# Patient Record
Sex: MALE | Race: WHITE | Employment: FULL TIME | URBAN - METROPOLITAN AREA
[De-identification: names, ages, dates, MRNs, and addresses within clinical notes are randomized per-mention and may not be internally consistent; named-entity substitution may affect disease eponyms.]

---

## 2020-01-21 ENCOUNTER — TRANSFERRED RECORDS (OUTPATIENT)
Dept: HEALTH INFORMATION MANAGEMENT | Facility: CLINIC | Age: 25
End: 2020-01-21

## 2020-04-16 ENCOUNTER — TRANSFERRED RECORDS (OUTPATIENT)
Dept: HEALTH INFORMATION MANAGEMENT | Facility: CLINIC | Age: 25
End: 2020-04-16

## 2020-06-04 ENCOUNTER — TRANSFERRED RECORDS (OUTPATIENT)
Dept: HEALTH INFORMATION MANAGEMENT | Facility: CLINIC | Age: 25
End: 2020-06-04

## 2020-06-05 NOTE — TELEPHONE ENCOUNTER
DIAGNOSIS: Send link via txt: [phone]. Referred by Demetrius Rodriguez from [facility] in [place]. 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 Xray competed 06/04/2020, MRI completed in 04/2020 at [facility]. Pt will call to have images pushed. Appt per pt.   APPOINTMENT DATE:    NOTES STATUS DETAILS   OFFICE NOTE from referring provider In process [facility]   OFFICE NOTE from other specialist     DISCHARGE SUMMARY from hospital     DISCHARGE REPORT from the ER     OPERATIVE REPORT     MEDICATION LIST     MRI N/A    CT SCAN recieved    XRAYS (IMAGES & REPORTS) Received      Imaging in [facility]/PACS    6/5/20  Called [facility] for records LVM

## 2020-06-08 ENCOUNTER — PRE VISIT (OUTPATIENT)
Dept: ORTHOPEDICS | Facility: CLINIC | Age: 25
End: 2020-06-08

## 2020-06-08 ENCOUNTER — VIRTUAL VISIT (OUTPATIENT)
Dept: ORTHOPEDICS | Facility: CLINIC | Age: 25
End: 2020-06-08
Payer: COMMERCIAL

## 2020-06-08 VITALS — HEIGHT: 70 IN | WEIGHT: 150 LBS | BODY MASS INDEX: 21.47 KG/M2

## 2020-06-08 DIAGNOSIS — M89.9 BONE LESION: Primary | ICD-10-CM

## 2020-06-08 RX ORDER — TADALAFIL 5 MG/1
5 TABLET ORAL EVERY EVENING
COMMUNITY
End: 2020-08-25

## 2020-06-08 ASSESSMENT — MIFFLIN-ST. JEOR: SCORE: 1666.65

## 2020-06-08 NOTE — LETTER
Date:June 24, 2020      Patient was self referred, no letter generated. Do not send.        [facility] Health Information

## 2020-06-08 NOTE — PROGRESS NOTES
[facility], Orthopaedic Oncology Surgery Consultation  by Koby Mendoza M.D.    Luis Muñoz MRN# [id number]   Age: 24 year old YOB: 1995     Requesting physician: Referred Self  No Ref-Primary, Physician  Dr. Chava Mejia, [facility]  Dr. Keyur Rodriguez, [place], [facility]            Assessment and Plan:   Assessment:  Lesion of L superior pubic ramus.  Diff Dx: Fibrous dysplasia (although radiographic appearance somewhat atypical), osteomyelitis, low grade central BARI, osteoblastoma.     Doubt urinary sx's are related to the pubic bone lesion.    Plan:  1. Whole body bone scan.  2. MRI of Pelvis  3. ESR, CRP, CBC  4. 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     Was seeing urologist due to dysuria.  Having penile pain, some discharge, possible prostatitis.  No evidence of any infection per patient.  Had XR and MRI done with bone lesion identified in pubic ramus.      Current symptoms:  Problem: some groin pain, R sided  Onset and duration:  Began 11/2019  Awakens from sleep due to sx's:no  Precipitating Injury:  no  Other joints or sites painful:  No  Fever: No  Appetite change or weight loss: No             Physical Exam:       EXAMINATION pertinent findings:   VITAL SIGNS: There were no vitals taken for this visit.  There is no height or weight on file to calculate BMI.   PSYCH: Pleasant, healthy-appearing, alert, oriented x3, cooperative. Normal mood and affect.  RESP: non labored breathing   ABD: deferred due to video visit  SKIN: grossly normal   LYMPHATIC: grossly normal   NEURO: grossly normal   VASCULAR: satisfactory perfusion of all extremities   MUSCULOSKELETAL:   Normal gait, no palpable mass per pt           Data:   All laboratory data reviewed  All imaging studies reviewed by  "me    CT pelvis 4/16/2020:  Left superior pubic ramus shows findings of bony expansion, mixed lytic and sclerotic densities without aggressive features.    Lucas Altman MD  [facility] Arthroplasty Fellow    Attending MD (Dr. Koby Mendoza) Attestation :  This patient was seen and evaluated by me including a history, exam, and interpretation of all imaging and/or lab data.  Either a training physician (resident/fellow), who also saw the patient, or scribe has documented the visit in the attached note.    Koby Mendoza MD  [facility] Family Professor  Oncology and Adult Reconstructive Surgery  Dept Orthopaedic Surgery, [facility]  [phone] office, [phone] pager  [email or web address]      Video-Visit Details    Luis Muñoz is a 24 year old male who is being evaluated via a billable video visit.      The patient has been notified of following:     \"This video visit will be conducted via a call between you and your physician/provider. We have found that certain health care needs can be provided without the need for an in-person physical exam.  This service lets us provide the care you need with a video conversation.  If a prescription is necessary we can send it directly to your pharmacy.  If lab work is needed we can place an order for that and you can then stop by our lab to have the test done at a later time.    Video visits are billed at different rates depending on your insurance coverage.  Please reach out to your insurance provider with any questions.    If during the course of the call the physician/provider feels a video visit is not appropriate, you will not be charged for this service.\"    Patient has given verbal consent for Video visit? YES    Type of service:  Video Visit    Video duration: 14 min, pre and post visit work time: 20 min, Total visit time: 34 min    Originating Location (pt. Location): Home    Distant Location (provider location):  [facility]    Mode of " Communication:  Video Conference via  VideumimPivotLink        DATA for DOCUMENTATION:         Past Medical History:   There is no problem list on file for this patient.    Past Medical History:   Diagnosis Date     Fetal alcohol spectrum disorder        Also see scanned health assessment forms.       Past Surgical History:     Past Surgical History:   Procedure Laterality Date     ORTHOPEDIC SURGERY  2014    right knee,             Social History:     Social History     Socioeconomic History     Marital status: Single     Spouse name: Not on file     Number of children: Not on file     Years of education: Not on file     Highest education level: Not on file   Occupational History     Not on file   Social Needs     Financial resource strain: Not on file     Food insecurity     Worry: Not on file     Inability: Not on file     Transportation needs     Medical: Not on file     Non-medical: Not on file   Tobacco Use     Smoking status: Current Every Day Smoker     Packs/day: 0.25   Substance and Sexual Activity     Alcohol use: No     Drug use: Yes     Types: Marijuana     Sexual activity: Not on file   Lifestyle     Physical activity     Days per week: Not on file     Minutes per session: Not on file     Stress: Not on file   Relationships     Social connections     Talks on phone: Not on file     Gets together: Not on file     Attends Denominational service: Not on file     Active member of club or organization: Not on file     Attends meetings of clubs or organizations: Not on file     Relationship status: Not on file     Intimate partner violence     Fear of current or ex partner: Not on file     Emotionally abused: Not on file     Physically abused: Not on file     Forced sexual activity: Not on file   Other Topics Concern     Not on file   Social History Narrative     Not on file            Family History:     No family history on file.         Medications:     Current Outpatient Medications   Medication Sig     citalopram  (CELEXA) 20 MG tablet Take 1 tablet (20 mg) by mouth daily     No current facility-administered medications for this visit.               Review of Systems:   A comprehensive 10 point review of systems (constitutional, ENT, cardiac, peripheral vascular, lymphatic, respiratory, GI, , Musculoskeletal, skin, Neurological) was performed and found to be negative except as described in this note.     See intake form completed by patient

## 2020-06-08 NOTE — PROGRESS NOTES
"Luis Muñoz is a 24 year old male who is being evaluated via a billable video visit.      The patient has been notified of following:     \"This video visit will be conducted via a call between you and your physician/provider. We have found that certain health care needs can be provided without the need for an in-person physical exam.  This service lets us provide the care you need with a video conversation.  If a prescription is necessary we can send it directly to your pharmacy.  If lab work is needed we can place an order for that and you can then stop by our lab to have the test done at a later time.    Video visits are billed at different rates depending on your insurance coverage.  Please reach out to your insurance provider with any questions.    If during the course of the call the physician/provider feels a video visit is not appropriate, you will not be charged for this service.\"    Patient has given verbal consent for Video visit? Yes    How would you like to obtain your AVS? Mail a copy    Patient would like the video invitation sent by: [phone].    Will anyone else be joining your video visit? No  "

## 2020-06-08 NOTE — LETTER
6/8/2020         RE: Luis Muñoz  [address]  [address]  [place] [address]        Dear Colleague,    Thank you for referring your patient, Luis Muñoz, to the [facility]. Please see a copy of my visit note below.        [facility], Orthopaedic Oncology Surgery Consultation  by Koby Mendoza M.D.    Luis Muñoz MRN# [id number]   Age: 24 year old YOB: 1995     Requesting physician: Referred Self  No Ref-Primary, Physician  Dr. Chava Mejia, [facility]  Dr. Keyur Rodriguez, [place], [facility]            Assessment and Plan:   Assessment:  Lesion of L superior pubic ramus.  Diff Dx: Fibrous dysplasia (although radiographic appearance somewhat atypical), osteomyelitis, low grade central BARI, osteoblastoma.     Doubt urinary sx's are related to the pubic bone lesion.    Plan:  1. Whole body bone scan.  2. MRI of Pelvis  3. ESR, CRP, CBC  4. 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     Was seeing urologist due to dysuria.  Having penile pain, some discharge, possible prostatitis.  No evidence of any infection per patient.  Had XR and MRI done with bone lesion identified in pubic ramus.      Current symptoms:  Problem: some groin pain, R sided  Onset and duration:  Began 11/2019  Awakens from sleep due to sx's:no  Precipitating Injury:  no  Other joints or sites painful:  No  Fever: No  Appetite change or weight loss: No             Physical Exam:       EXAMINATION pertinent findings:   VITAL SIGNS: There were no vitals taken for this visit.  There is no height or weight on file to calculate BMI.   PSYCH: Pleasant, healthy-appearing, alert, oriented x3, cooperative. Normal mood and affect.  RESP: non labored breathing   ABD: deferred due to video visit  SKIN:  "grossly normal   LYMPHATIC: grossly normal   NEURO: grossly normal   VASCULAR: satisfactory perfusion of all extremities   MUSCULOSKELETAL:   Normal gait, no palpable mass per pt           Data:   All laboratory data reviewed  All imaging studies reviewed by me    CT pelvis 4/16/2020:  Left superior pubic ramus shows findings of bony expansion, mixed lytic and sclerotic densities without aggressive features.    Lucas Altman MD  [facility] Arthroplasty Fellow    Attending MD (Dr. Koby Mendoza) Attestation :  This patient was seen and evaluated by me including a history, exam, and interpretation of all imaging and/or lab data.  Either a training physician (resident/fellow), who also saw the patient, or scribe has documented the visit in the attached note.    Koby Mendoza MD  [facility] Family Professor  Oncology and Adult Reconstructive Surgery  Dept Orthopaedic Surgery, [facility]  [phone] office, [phone] pager  [email or web address]      Video-Visit Details    Luis Muñoz is a 24 year old male who is being evaluated via a billable video visit.      The patient has been notified of following:     \"This video visit will be conducted via a call between you and your physician/provider. We have found that certain health care needs can be provided without the need for an in-person physical exam.  This service lets us provide the care you need with a video conversation.  If a prescription is necessary we can send it directly to your pharmacy.  If lab work is needed we can place an order for that and you can then stop by our lab to have the test done at a later time.    Video visits are billed at different rates depending on your insurance coverage.  Please reach out to your insurance provider with any questions.    If during the course of the call the physician/provider feels a video visit is not appropriate, you will not be charged for this service.\"    Patient has given verbal consent for Video visit? " YES    Type of service:  Video Visit    Video duration: 14 min, pre and post visit work time: 20 min, Total visit time: 34 min    Originating Location (pt. Location): Home    Distant Location (provider location):  [facility]    Mode of Communication:  Video Conference via  Doximity        DATA for DOCUMENTATION:         Past Medical History:   There is no problem list on file for this patient.    Past Medical History:   Diagnosis Date     Fetal alcohol spectrum disorder        Also see scanned health assessment forms.       Past Surgical History:     Past Surgical History:   Procedure Laterality Date     ORTHOPEDIC SURGERY  2014    right knee,             Social History:     Social History     Socioeconomic History     Marital status: Single     Spouse name: Not on file     Number of children: Not on file     Years of education: Not on file     Highest education level: Not on file   Occupational History     Not on file   Social Needs     Financial resource strain: Not on file     Food insecurity     Worry: Not on file     Inability: Not on file     Transportation needs     Medical: Not on file     Non-medical: Not on file   Tobacco Use     Smoking status: Current Every Day Smoker     Packs/day: 0.25   Substance and Sexual Activity     Alcohol use: No     Drug use: Yes     Types: Marijuana     Sexual activity: Not on file   Lifestyle     Physical activity     Days per week: Not on file     Minutes per session: Not on file     Stress: Not on file   Relationships     Social connections     Talks on phone: Not on file     Gets together: Not on file     Attends [employer] service: Not on file     Active member of club or organization: Not on file     Attends meetings of clubs or organizations: Not on file     Relationship status: Not on file     Intimate partner violence     Fear of current or ex partner: Not on file     Emotionally abused: Not on file     Physically abused: Not on file     Forced sexual  "activity: Not on file   Other Topics Concern     Not on file   Social History Narrative     Not on file            Family History:     No family history on file.         Medications:     Current Outpatient Medications   Medication Sig     citalopram (CELEXA) 20 MG tablet Take 1 tablet (20 mg) by mouth daily     No current facility-administered medications for this visit.               Review of Systems:   A comprehensive 10 point review of systems (constitutional, ENT, cardiac, peripheral vascular, lymphatic, respiratory, GI, , Musculoskeletal, skin, Neurological) was performed and found to be negative except as described in this note.     See intake form completed by patient        Luis Muñoz is a 24 year old male who is being evaluated via a billable video visit.      The patient has been notified of following:     \"This video visit will be conducted via a call between you and your physician/provider. We have found that certain health care needs can be provided without the need for an in-person physical exam.  This service lets us provide the care you need with a video conversation.  If a prescription is necessary we can send it directly to your pharmacy.  If lab work is needed we can place an order for that and you can then stop by our lab to have the test done at a later time.    Video visits are billed at different rates depending on your insurance coverage.  Please reach out to your insurance provider with any questions.    If during the course of the call the physician/provider feels a video visit is not appropriate, you will not be charged for this service.\"    Patient has given verbal consent for Video visit? Yes    How would you like to obtain your AVS? Mail a copy    Patient would like the video invitation sent by: [phone].    Will anyone else be joining your video visit? No    Again, thank you for allowing me to participate in the care of your patient.        Sincerely,        Koby RIVAS" MD Reji

## 2020-06-08 NOTE — LETTER
6/8/2020    RE: Luis Muñoz  [address]  [address]  [facility] [address]        [facility], Orthopaedic Oncology Surgery Consultation  by Koby Mendoza M.D.    Luis Mñuoz MRN# [id number]   Age: 24 year old YOB: 1995     Requesting physician: Referred Self  No Ref-Primary, Physician  Dr. Chava Mejia, [facility]  Dr. Keyur Rodriguez, [place], [facility]            Assessment and Plan:   Assessment:  Lesion of L superior pubic ramus.  Diff Dx: Fibrous dysplasia (although radiographic appearance somewhat atypical), osteomyelitis, low grade central BRAI, osteoblastoma.     Doubt urinary sx's are related to the pubic bone lesion.    Plan:  1. Whole body bone scan.  2. MRI of Pelvis  3. ESR, CRP, CBC  4. 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     Was seeing urologist due to dysuria.  Having penile pain, some discharge, possible prostatitis.  No evidence of any infection per patient.  Had XR and MRI done with bone lesion identified in pubic ramus.    Current symptoms:  Problem: some groin pain, R sided  Onset and duration:  Began 11/2019  Awakens from sleep due to sx's:no  Precipitating Injury:  no  Other joints or sites painful:  No  Fever: No  Appetite change or weight loss: No         Physical Exam:       EXAMINATION pertinent findings:   VITAL SIGNS: There were no vitals taken for this visit.  There is no height or weight on file to calculate BMI.   PSYCH: Pleasant, healthy-appearing, alert, oriented x3, cooperative. Normal mood and affect.  RESP: non labored breathing   ABD: deferred due to video visit  SKIN: grossly normal   LYMPHATIC: grossly normal   NEURO: grossly normal   VASCULAR: satisfactory perfusion of all extremities   MUSCULOSKELETAL:   Normal gait, no palpable mass per pt       "   Data:   All laboratory data reviewed  All imaging studies reviewed by me    CT pelvis 4/16/2020:  Left superior pubic ramus shows findings of bony expansion, mixed lytic and sclerotic densities without aggressive features.    Lucas Altman MD  [facility] Arthroplasty Fellow    Attending MD (Dr. Koby Mendoza) Attestation :  This patient was seen and evaluated by me including a history, exam, and interpretation of all imaging and/or lab data.  Either a training physician (resident/fellow), who also saw the patient, or scribe has documented the visit in the attached note.    Koby Mendoza MD  [facility] Family Professor  Oncology and Adult Reconstructive Surgery  Dept Orthopaedic Surgery, [facility]  [phone] office, [phone] pager  [email or web address]      Video-Visit Details    Luis Muñoz is a 24 year old male who is being evaluated via a billable video visit.    The patient has been notified of following:     \"This video visit will be conducted via a call between you and your physician/provider. We have found that certain health care needs can be provided without the need for an in-person physical exam.  This service lets us provide the care you need with a video conversation.  If a prescription is necessary we can send it directly to your pharmacy.  If lab work is needed we can place an order for that and you can then stop by our lab to have the test done at a later time.    Video visits are billed at different rates depending on your insurance coverage.  Please reach out to your insurance provider with any questions.    If during the course of the call the physician/provider feels a video visit is not appropriate, you will not be charged for this service.\"    Patient has given verbal consent for Video visit? YES    Type of service:  Video Visit    Video duration: 14 min, pre and post visit work time: 20 min, Total visit time: 34 min    Originating Location (pt. Location): Home    Distant Location " (provider location):  [facility]    Mode of Communication:  Video Conference via  [email or web address]        DATA for DOCUMENTATION:         Past Medical History:   There is no problem list on file for this patient.    Past Medical History:   Diagnosis Date     Fetal alcohol spectrum disorder      Also see scanned health assessment forms.       Past Surgical History:     Past Surgical History:   Procedure Laterality Date     ORTHOPEDIC SURGERY  2014    right knee,             Social History:     Social History     Socioeconomic History     Marital status: Single     Spouse name: Not on file     Number of children: Not on file     Years of education: Not on file     Highest education level: Not on file   Occupational History     Not on file   Social Needs     Financial resource strain: Not on file     Food insecurity     Worry: Not on file     Inability: Not on file     Transportation needs     Medical: Not on file     Non-medical: Not on file   Tobacco Use     Smoking status: Current Every Day Smoker     Packs/day: 0.25   Substance and Sexual Activity     Alcohol use: No     Drug use: Yes     Types: Marijuana     Sexual activity: Not on file   Lifestyle     Physical activity     Days per week: Not on file     Minutes per session: Not on file     Stress: Not on file   Relationships     Social connections     Talks on phone: Not on file     Gets together: Not on file     Attends Buddhist service: Not on file     Active member of club or organization: Not on file     Attends meetings of clubs or organizations: Not on file     Relationship status: Not on file     Intimate partner violence     Fear of current or ex partner: Not on file     Emotionally abused: Not on file     Physically abused: Not on file     Forced sexual activity: Not on file   Other Topics Concern     Not on file   Social History Narrative     Not on file            Family History:     No family history on file.         Medications:     Current  "Outpatient Medications   Medication Sig     citalopram (CELEXA) 20 MG tablet Take 1 tablet (20 mg) by mouth daily     No current facility-administered medications for this visit.           Review of Systems:   A comprehensive 10 point review of systems (constitutional, ENT, cardiac, peripheral vascular, lymphatic, respiratory, GI, , Musculoskeletal, skin, Neurological) was performed and found to be negative except as described in this note.     See intake form completed by patient    Luis Muñoz is a 24 year old male who is being evaluated via a billable video visit.    The patient has been notified of following:     \"This video visit will be conducted via a call between you and your physician/provider. We have found that certain health care needs can be provided without the need for an in-person physical exam.  This service lets us provide the care you need with a video conversation.  If a prescription is necessary we can send it directly to your pharmacy.  If lab work is needed we can place an order for that and you can then stop by our lab to have the test done at a later time.    Video visits are billed at different rates depending on your insurance coverage.  Please reach out to your insurance provider with any questions.    If during the course of the call the physician/provider feels a video visit is not appropriate, you will not be charged for this service.\"    Patient has given verbal consent for Video visit? Yes    How would you like to obtain your AVS? Mail a copy    Patient would like the video invitation sent by: [phone].    Will anyone else be joining your video visit? No      Koby Mendoza MD  "

## 2020-06-11 ENCOUNTER — HOSPITAL ENCOUNTER (OUTPATIENT)
Dept: NUCLEAR MEDICINE | Facility: CLINIC | Age: 25
Setting detail: NUCLEAR MEDICINE
End: 2020-06-11
Attending: ORTHOPAEDIC SURGERY
Payer: COMMERCIAL

## 2020-06-11 ENCOUNTER — HOSPITAL ENCOUNTER (OUTPATIENT)
Dept: MRI IMAGING | Facility: CLINIC | Age: 25
Discharge: HOME | End: 2020-06-11
Attending: ORTHOPAEDIC SURGERY | Admitting: ORTHOPAEDIC SURGERY
Payer: COMMERCIAL

## 2020-06-11 DIAGNOSIS — M89.9 BONE LESION: ICD-10-CM

## 2020-06-11 PROCEDURE — 34300033 ZZH RX 343: Performed by: ORTHOPAEDIC SURGERY

## 2020-06-11 PROCEDURE — A9585 GADOBUTROL INJECTION: HCPCS | Performed by: ORTHOPAEDIC SURGERY

## 2020-06-11 PROCEDURE — 72197 MRI PELVIS W/O & W/DYE: CPT

## 2020-06-11 PROCEDURE — 25500064 ZZH RX 255 OP 636: Performed by: ORTHOPAEDIC SURGERY

## 2020-06-11 PROCEDURE — A9503 TC99M MEDRONATE: HCPCS | Performed by: ORTHOPAEDIC SURGERY

## 2020-06-11 PROCEDURE — 78315 BONE IMAGING 3 PHASE: CPT

## 2020-06-11 RX ORDER — GADOBUTROL 604.72 MG/ML
6 INJECTION INTRAVENOUS ONCE
Status: COMPLETED | OUTPATIENT
Start: 2020-06-11 | End: 2020-06-11

## 2020-06-11 RX ORDER — TC 99M MEDRONATE 20 MG/10ML
25 INJECTION, POWDER, LYOPHILIZED, FOR SOLUTION INTRAVENOUS ONCE
Status: COMPLETED | OUTPATIENT
Start: 2020-06-11 | End: 2020-06-11

## 2020-06-11 RX ADMIN — GADOBUTROL 6 ML: 604.72 INJECTION INTRAVENOUS at 11:11

## 2020-06-11 RX ADMIN — TC 99M MEDRONATE 23.5 MCI.: 20 INJECTION, POWDER, LYOPHILIZED, FOR SOLUTION INTRAVENOUS at 09:12

## 2020-06-12 ENCOUNTER — TELEPHONE (OUTPATIENT)
Dept: ORTHOPEDICS | Facility: CLINIC | Age: 25
End: 2020-06-12

## 2020-06-12 DIAGNOSIS — M89.9 BONE LESION: ICD-10-CM

## 2020-06-12 LAB
BASOPHILS # BLD AUTO: 0.1 10E9/L (ref 0–0.2)
BASOPHILS NFR BLD AUTO: 0.5 %
CRP SERPL-MCNC: <2.9 MG/L (ref 0–8)
DIFFERENTIAL METHOD BLD: NORMAL
EOSINOPHIL # BLD AUTO: 0.1 10E9/L (ref 0–0.7)
EOSINOPHIL NFR BLD AUTO: 1.4 %
ERYTHROCYTE [DISTWIDTH] IN BLOOD BY AUTOMATED COUNT: 12.6 % (ref 10–15)
ERYTHROCYTE [SEDIMENTATION RATE] IN BLOOD BY WESTERGREN METHOD: 1 MM/H (ref 0–15)
HCT VFR BLD AUTO: 47.3 % (ref 40–53)
HGB BLD-MCNC: 16.4 G/DL (ref 13.3–17.7)
IMM GRANULOCYTES # BLD: 0 10E9/L (ref 0–0.4)
IMM GRANULOCYTES NFR BLD: 0.2 %
LYMPHOCYTES # BLD AUTO: 1.4 10E9/L (ref 0.8–5.3)
LYMPHOCYTES NFR BLD AUTO: 13.8 %
MCH RBC QN AUTO: 29.8 PG (ref 26.5–33)
MCHC RBC AUTO-ENTMCNC: 34.7 G/DL (ref 31.5–36.5)
MCV RBC AUTO: 86 FL (ref 78–100)
MONOCYTES # BLD AUTO: 0.5 10E9/L (ref 0–1.3)
MONOCYTES NFR BLD AUTO: 4.8 %
NEUTROPHILS # BLD AUTO: 8 10E9/L (ref 1.6–8.3)
NEUTROPHILS NFR BLD AUTO: 79.3 %
NRBC # BLD AUTO: 0 10*3/UL
NRBC BLD AUTO-RTO: 0 /100
PLATELET # BLD AUTO: 170 10E9/L (ref 150–450)
RBC # BLD AUTO: 5.5 10E12/L (ref 4.4–5.9)
WBC # BLD AUTO: 10.1 10E9/L (ref 4–11)

## 2020-06-12 PROCEDURE — 85025 COMPLETE CBC W/AUTO DIFF WBC: CPT | Performed by: ORTHOPAEDIC SURGERY

## 2020-06-12 PROCEDURE — 86140 C-REACTIVE PROTEIN: CPT | Performed by: ORTHOPAEDIC SURGERY

## 2020-06-12 PROCEDURE — 85652 RBC SED RATE AUTOMATED: CPT | Performed by: ORTHOPAEDIC SURGERY

## 2020-06-12 PROCEDURE — 36415 COLL VENOUS BLD VENIPUNCTURE: CPT | Performed by: ORTHOPAEDIC SURGERY

## 2020-06-12 NOTE — TELEPHONE ENCOUNTER
Health Call Center    Phone Message    May a detailed message be left on voicemail: yes     Reason for Call: Requesting Results   Name/type of test:   1. MR Pelvis Bone wo & w Contrast  2. NM Bone 3 Phase With Whole Body  Date of test: 06/12/20  Was test done at a location other than [facility] (Please fill in the location if not [facility])?: No    Please call patient today to go over imaging results thank you,      Action Taken: Message routed to:  [facility] ortho    Travel Screening: Not Applicable

## 2020-06-12 NOTE — TELEPHONE ENCOUNTER
This writer spoke to patient.  We discussed last visit with Dr. Mendoza. Reviewed the imaging. Pt. to be scheduled for lab work. Pt. anxious and frustrated with urology issues. Pt. is not sure if it's related or not to his bone lesion. Plan: discuss results with Dr. Mendoza/ referral as needed.    Kori Rios RN on 6/12/2020 at 11:47 AM

## 2020-06-13 NOTE — RESULT ENCOUNTER NOTE
Dear Mr. Muñoz:    I rev'd all the tests.  A specific diagnosis is not evident.  The chance of a malignant tumor is quite small.  There are two pathways forward, either watch with serial imaging or do a biopsy.    I can discuss the pros/cons with you at your next visit.        Koby Mendoza MD  6/13/2020  9:02 AM

## 2020-06-15 ENCOUNTER — VIRTUAL VISIT (OUTPATIENT)
Dept: ORTHOPEDICS | Facility: CLINIC | Age: 25
End: 2020-06-15
Payer: COMMERCIAL

## 2020-06-15 DIAGNOSIS — M89.9 BONE LESION: Primary | ICD-10-CM

## 2020-06-15 NOTE — PROGRESS NOTES
[facility], Orthopaedic Oncology Surgery Consultation  by Koby Mendoza M.D.    Luis Muñoz MRN# [id number]   Age: 24 year old YOB: 1995     Requesting physician: Referred Self  No Ref-Primary, Physician  Dr. Chava Mejia, [facility]  Dr. Keyur Rodriguez, [place], [facility]            Assessment and Plan:   Assessment:  24-year-old male with a lesion of L superior pubic ramus of unknown etiology.    Plan:  We discussed the findings with the patient.  In order to obtain a definitive diagnoses a biopsy of the lesion was proposed. Tissue samples will be sent for histopathology and cultures (aerobic/anaerobic). We discussed the procedure, risks and rehabilitation. Patient understand and agrees to the treatment plan. We will also arrange for a referral to the [facility] Urology department concerning his dysuria, prostate and pelvic floor complaints.  Also preoperative COVID-19 testing will be performed.            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     Patient is seen today to discuss the results of his bone scan, MRI and lab results.    Lab work showed no abnormalities. Imaging testing did not lead to a definitive conclusion.    Current symptoms:  Problem: some groin pain, R sided  Onset and duration:  Began 11/2019  Awakens from sleep due to sx's:no  Precipitating Injury:  no  Other joints or sites painful:  No  Fever: No  Appetite change or weight loss: No           Physical Exam:       Deferred due to nature of visit           Data:   All laboratory data reviewed  All imaging studies reviewed by me    CT pelvis 4/16/2020:  Left superior pubic ramus shows findings of bony expansion, mixed lytic and sclerotic densities without aggressive features.    MRI 6/11/2020:  Impression:   1. Corresponding to radiographic and CT abnormality,  heterogeneous and  enlarged appearance of the left pubic body and superior pubic ramus.  Although technically, non-specific finding by imaging, given the  internal area of fatty marrow preservation, this is favored to  represent sequale of remote trauma or infection. Continued follow up  imaging for stability may be helpful if tissue sampling is not  planned.    NM bone scan 6/11/2020:  IMPRESSION:   Mild Tc 99m MDP uptake in the superior left pubic ramus only on  delayed phase. Findings are unlikely to represent osteomyelitis. No  additional lesions.     Lucas Altman MD  [facility] Arthroplasty Fellow      Attending MD (Dr. Koby Mendoza) Attestation :  This patient was seen and evaluated by me including a history, exam, and interpretation of all imaging and/or lab data.  Either a training physician (resident/fellow), who also saw the patient, or scribe has documented the visit in the attached note.    MD Jemal Max Family Professor  Oncology and Adult Reconstructive Surgery  Dept Orthopaedic Surgery, [facility]  [phone] office, [phone] pager  [email or web address]

## 2020-06-15 NOTE — LETTER
6/15/2020      RE: Luis Muñoz  [address]  [address]  [facility] [address]    Dear Colleague,    Thank you for referring your patient, Luis Muñoz, to the [facility]. Please see a copy of my visit note below.        [facility], Orthopaedic Oncology Surgery Consultation  by Koby Mendoza M.D.    Luis Muñoz MRN# [id number]   Age: 24 year old YOB: 1995     Requesting physician: Referred Self  No Ref-Primary, Physician  Dr. Chava Mejia, [facility]  Dr. Keyur Rodriguez, [facility]            Assessment and Plan:   Assessment:  24-year-old male with a lesion of L superior pubic ramus of unknown etiology.    Plan:  We discussed the findings with the patient.  In order to obtain a definitive diagnoses a biopsy of the lesion was proposed. Tissue samples will be sent for histopathology and cultures (aerobic/anaerobic). We discussed the procedure, risks and rehabilitation. Patient understand and agrees to the treatment plan. We will also arrange for a referral to the [facility] Urology department concerning his dysuria, prostate and pelvic floor complaints.  Also preoperative COVID-19 testing will be performed.            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     Patient is seen today to discuss the results of his bone scan, MRI and lab results.    Lab work showed no abnormalities. Imaging testing did not lead to a definitive conclusion.    Current symptoms:  Problem: some groin pain, R sided  Onset and duration:  Began 11/2019  Awakens from sleep due to sx's:no  Precipitating Injury:  no  Other joints or sites painful:  No  Fever: No  Appetite change or weight loss: No           Physical Exam:       Deferred due to nature of visit           Data:   All laboratory data reviewed  All imaging studies reviewed by  "me    CT pelvis 4/16/2020:  Left superior pubic ramus shows findings of bony expansion, mixed lytic and sclerotic densities without aggressive features.    MRI 6/11/2020:  Impression:   1. Corresponding to radiographic and CT abnormality, heterogeneous and  enlarged appearance of the left pubic body and superior pubic ramus.  Although technically, non-specific finding by imaging, given the  internal area of fatty marrow preservation, this is favored to  represent sequale of remote trauma or infection. Continued follow up  imaging for stability may be helpful if tissue sampling is not  planned.    NM bone scan 6/11/2020:  IMPRESSION:   Mild Tc 99m MDP uptake in the superior left pubic ramus only on  delayed phase. Findings are unlikely to represent osteomyelitis. No  additional lesions.     Lucas Altman MD  [facility] Arthroplasty Fellow      Attending MD (Dr. Koby Mendoza) Attestation :  This patient was seen and evaluated by me including a history, exam, and interpretation of all imaging and/or lab data.  Either a training physician (resident/fellow), who also saw the patient, or scribe has documented the visit in the attached note.    Koby Mendoza MD  [facility] Family Professor  Oncology and Adult Reconstructive Surgery  Dept Orthopaedic Surgery, [facility]  [phone] office, [phone] pager  [email or web address]          Reason For Visit:   Chief Complaint   Patient presents with     RECHECK      txt: [phone] Review Bone scan, MRI, labs. Pelvic mass.       There were no vitals taken for this visit.    Pain Assessment  Patient Currently in Pain: [place]    Pura Lane LPN    Luis Muñoz is a 24 year old male who is being evaluated via a billable video visit.      The patient has been notified of following:     \"This video visit will be conducted via a call between you and your physician/provider. We have found that certain health care needs can be provided without the need for an in-person " "physical exam.  This service lets us provide the care you need with a video conversation.  If a prescription is necessary we can send it directly to your pharmacy.  If lab work is needed we can place an order for that and you can then stop by our lab to have the test done at a later time.    Video visits are billed at different rates depending on your insurance coverage.  Please reach out to your insurance provider with any questions.    If during the course of the call the physician/provider feels a video visit is not appropriate, you will not be charged for this service.\"    Patient has given verbal consent for Video visit?yes    How would you like to obtain your AVS? Mail a copy  Patient would like the video invitation sent by: Text to cell phone: [phone]    Will anyone else be joining your video visit? No        Video-Visit Details    Type of service:  Video Visit    Video duration 20 min. Pre and post work 15 min, total time 35 min    Originating Location (pt. Location): Home    Distant Location (provider location):  [facility]THOP[facility]ED[facility] CLINIC     Platform used for Video Visit: [facility]    Koby Mendoza MD    "

## 2020-06-15 NOTE — LETTER
6/15/2020       RE: Luis Muñoz  [address]  [address]  [facility] [address]        [facility], Orthopaedic Oncology Surgery Consultation  by Koby Mendoza M.D.    Luis Muñoz MRN# [id number]   Age: 24 year old YOB: 1995     Requesting physician: Referred Self  No Ref-Primary, Physician  Dr. Chava Mejia, [facility]  Dr. Keyur Rodriguez, [place], [facility]            Assessment and Plan:   Assessment:  24-year-old male with a lesion of L superior pubic ramus of unknown etiology.    Plan:  We discussed the findings with the patient.  In order to obtain a definitive diagnoses a biopsy of the lesion was proposed. Tissue samples will be sent for histopathology and cultures (aerobic/anaerobic). We discussed the procedure, risks and rehabilitation. Patient understand and agrees to the treatment plan. We will also arrange for a referral to the [facility] Urology department concerning his dysuria, prostate and pelvic floor complaints.  Also preoperative COVID-19 testing will be performed.            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     Patient is seen today to discuss the results of his bone scan, MRI and lab results.    Lab work showed no abnormalities. Imaging testing did not lead to a definitive conclusion.    Current symptoms:  Problem: some groin pain, R sided  Onset and duration:  Began 11/2019  Awakens from sleep due to sx's:no  Precipitating Injury:  no  Other joints or sites painful:  No  Fever: No  Appetite change or weight loss: No           Physical Exam:       Deferred due to nature of visit           Data:   All laboratory data reviewed  All imaging studies reviewed by me    CT pelvis 4/16/2020:  Left superior pubic ramus shows findings of bony expansion, mixed lytic and sclerotic densities without aggressive features.    MRI  "6/11/2020:  Impression:   1. Corresponding to radiographic and CT abnormality, heterogeneous and  enlarged appearance of the left pubic body and superior pubic ramus.  Although technically, non-specific finding by imaging, given the  internal area of fatty marrow preservation, this is favored to  represent sequale of remote trauma or infection. Continued follow up  imaging for stability may be helpful if tissue sampling is not  planned.    NM bone scan 6/11/2020:  IMPRESSION:   Mild Tc 99m MDP uptake in the superior left pubic ramus only on  delayed phase. Findings are unlikely to represent osteomyelitis. No  additional lesions.     Lucas Altman MD  [facility] Arthroplasty Fellow      Attending MD (Dr. Koby Mendoza) Attestation :  This patient was seen and evaluated by me including a history, exam, and interpretation of all imaging and/or lab data.  Either a training physician (resident/fellow), who also saw the patient, or scribe has documented the visit in the attached note.    Koby Mendoza MD  [facility] Family Professor  Oncology and Adult Reconstructive Surgery  Dept Orthopaedic Surgery, [facility]  [phone] office, [phone] pager  [email or web address]          Reason For Visit:   Chief Complaint   Patient presents with     RECHECK      txt: [phone] Review Bone scan, MRI, labs. Pelvic mass.       There were no vitals taken for this visit.    Pain Assessment  Patient Currently in Pain: [place]    Pura Lane LPN    Luis Muñoz is a 24 year old male who is being evaluated via a billable video visit.      The patient has been notified of following:     \"This video visit will be conducted via a call between you and your physician/provider. We have found that certain health care needs can be provided without the need for an in-person physical exam.  This service lets us provide the care you need with a video conversation.  If a prescription is necessary we can send it directly to your " "pharmacy.  If lab work is needed we can place an order for that and you can then stop by our lab to have the test done at a later time.    Video visits are billed at different rates depending on your insurance coverage.  Please reach out to your insurance provider with any questions.    If during the course of the call the physician/provider feels a video visit is not appropriate, you will not be charged for this service.\"    Patient has given verbal consent for Video visit?yes    How would you like to obtain your AVS? Mail a copy  Patient would like the video invitation sent by: Text to cell phone: [phone]    Will anyone else be joining your video visit? No        Video-Visit Details    Type of service:  Video Visit    Video duration 20 min. Pre and post work 15 min, total time 35 min    Originating Location (pt. Location): Home    Distant Location (provider location):  [facility] ORTHOPAEDIC CLINIC     Platform used for Video Visit: [facility]    Koby Mendoza MD    "

## 2020-06-15 NOTE — PROGRESS NOTES
"Reason For Visit:   Chief Complaint   Patient presents with     RECHECK      txt: [phone] Review Bone scan, MRI, labs. Pelvic mass.       There were no vitals taken for this visit.    Pain Assessment  Patient Currently in Pain: Lindsay Lane LPN    Luis Muñoz is a 24 year old male who is being evaluated via a billable video visit.      The patient has been notified of following:     \"This video visit will be conducted via a call between you and your physician/provider. We have found that certain health care needs can be provided without the need for an in-person physical exam.  This service lets us provide the care you need with a video conversation.  If a prescription is necessary we can send it directly to your pharmacy.  If lab work is needed we can place an order for that and you can then stop by our lab to have the test done at a later time.    Video visits are billed at different rates depending on your insurance coverage.  Please reach out to your insurance provider with any questions.    If during the course of the call the physician/provider feels a video visit is not appropriate, you will not be charged for this service.\"    Patient has given verbal consent for Video visit?yes    How would you like to obtain your AVS? Mail a copy  Patient would like the video invitation sent by: Text to cell phone: [phone]    Will anyone else be joining your video visit? No        Video-Visit Details    Type of service:  Video Visit    Video duration 20 min. Pre and post work 15 min, total time 35 min    Originating Location (pt. Location): Home    Distant Location (provider location):  [facility]     Platform used for Video Visit: [facility]    Koby Mendoza MD          "

## 2020-06-16 ENCOUNTER — PREP FOR PROCEDURE (OUTPATIENT)
Dept: ORTHOPEDICS | Facility: CLINIC | Age: 25
End: 2020-06-16

## 2020-06-16 ENCOUNTER — TELEPHONE (OUTPATIENT)
Dept: ORTHOPEDICS | Facility: CLINIC | Age: 25
End: 2020-06-16

## 2020-06-16 DIAGNOSIS — M89.9 BONE LESION: Primary | ICD-10-CM

## 2020-06-18 ENCOUNTER — TELEPHONE (OUTPATIENT)
Dept: UROLOGY | Facility: CLINIC | Age: 25
End: 2020-06-18

## 2020-06-18 DIAGNOSIS — Z11.59 ENCOUNTER FOR SCREENING FOR OTHER VIRAL DISEASES: Primary | ICD-10-CM

## 2020-06-18 NOTE — TELEPHONE ENCOUNTER
[facility] Call Center    Phone Message    May a detailed message be left on voicemail: yes     Reason for Call: Other: Pt would like a call back to set up biopsy.      Action Taken: Message routed to:  [facility] ([facility] [email or web address]     Travel Screening: Not Applicable

## 2020-06-18 NOTE — TELEPHONE ENCOUNTER
Returned call to patient regarding scheduling surgery with Dr Mendoza. Left message with my direct number to call back when he is able. [phone]

## 2020-06-19 ENCOUNTER — ANESTHESIA EVENT (OUTPATIENT)
Dept: SURGERY | Facility: CLINIC | Age: 25
End: 2020-06-19
Payer: COMMERCIAL

## 2020-06-19 ENCOUNTER — OFFICE VISIT (OUTPATIENT)
Dept: SURGERY | Facility: CLINIC | Age: 25
End: 2020-06-19
Payer: COMMERCIAL

## 2020-06-19 ENCOUNTER — PRE VISIT (OUTPATIENT)
Dept: SURGERY | Facility: CLINIC | Age: 25
End: 2020-06-19

## 2020-06-19 ENCOUNTER — AMBULATORY - HEALTHEAST (OUTPATIENT)
Dept: INTERNAL MEDICINE | Facility: CLINIC | Age: 25
End: 2020-06-19

## 2020-06-19 VITALS
OXYGEN SATURATION: 98 % | DIASTOLIC BLOOD PRESSURE: 68 MMHG | SYSTOLIC BLOOD PRESSURE: 108 MMHG | HEIGHT: 70 IN | WEIGHT: 151 LBS | RESPIRATION RATE: 14 BRPM | TEMPERATURE: 98.1 F | HEART RATE: 75 BPM | BODY MASS INDEX: 21.62 KG/M2

## 2020-06-19 DIAGNOSIS — Z01.818 PREOP EXAMINATION: ICD-10-CM

## 2020-06-19 DIAGNOSIS — M89.9 BONE LESION: ICD-10-CM

## 2020-06-19 DIAGNOSIS — Z01.818 PREOP EXAMINATION: Primary | ICD-10-CM

## 2020-06-19 DIAGNOSIS — Z11.59 ENCOUNTER FOR SCREENING FOR OTHER VIRAL DISEASES: ICD-10-CM

## 2020-06-19 LAB
ANION GAP SERPL CALCULATED.3IONS-SCNC: 6 MMOL/L (ref 3–14)
BUN SERPL-MCNC: 11 MG/DL (ref 7–30)
CALCIUM SERPL-MCNC: 9.3 MG/DL (ref 8.5–10.1)
CHLORIDE SERPL-SCNC: 107 MMOL/L (ref 94–109)
CO2 SERPL-SCNC: 25 MMOL/L (ref 20–32)
CREAT SERPL-MCNC: 0.87 MG/DL (ref 0.66–1.25)
GFR SERPL CREATININE-BSD FRML MDRD: >90 ML/MIN/{1.73_M2}
GLUCOSE SERPL-MCNC: 87 MG/DL (ref 70–99)
POTASSIUM SERPL-SCNC: 4.1 MMOL/L (ref 3.4–5.3)
SODIUM SERPL-SCNC: 138 MMOL/L (ref 133–144)

## 2020-06-19 ASSESSMENT — LIFESTYLE VARIABLES: TOBACCO_USE: 1

## 2020-06-19 ASSESSMENT — MIFFLIN-ST. JEOR: SCORE: 1671.18

## 2020-06-19 ASSESSMENT — PAIN SCALES - GENERAL: PAINLEVEL: NO PAIN (0)

## 2020-06-19 NOTE — ANESTHESIA PREPROCEDURE EVALUATION
"Anesthesia Pre-Procedure Evaluation    Patient: Luis Muñoz   MRN:     [id number] Gender:   male   Age:    24 year old :      1995        Preoperative Diagnosis: Bone lesion [M89.9]   Procedure(s):  Open biopsy left superior pubic ramus     LABS:  CBC:   Lab Results   Component Value Date    WBC 10.1 2020    HGB 16.4 2020    HCT 47.3 2020     2020     BMP: No results found for: NA, POTASSIUM, CHLORIDE, CO2, BUN, CR, GLC  COAGS: No results found for: PTT, INR, FIBR  POC: No results found for: BGM, HCG, HCGS  OTHER:   Lab Results   Component Value Date    CRP <2.9 2020    SED 1 2020        Preop Vitals    BP Readings from Last 3 Encounters:   10/28/14 136/88    Pulse Readings from Last 3 Encounters:   10/28/14 77      Resp Readings from Last 3 Encounters:   10/28/14 16    SpO2 Readings from Last 3 Encounters:   10/28/14 98%      Temp Readings from Last 1 Encounters:   10/28/14 98  F (36.7  C) (Oral)    Ht Readings from Last 1 Encounters:   20 1.778 m (5' 10\")      Wt Readings from Last 1 Encounters:   20 68 kg (150 lb)    Estimated body mass index is 21.52 kg/m  as calculated from the following:    Height as of 20: 1.778 m (5' 10\").    Weight as of 20: 68 kg (150 lb).     LDA:  Peripheral IV 20 Left (Active)   Number of days: 8        Past Medical History:   Diagnosis Date     Fetal alcohol spectrum disorder       Past Surgical History:   Procedure Laterality Date     ORTHOPEDIC SURGERY  2014    right knee,       No Known Allergies     Anesthesia Evaluation     . Pt has had prior anesthetic. Type: Regional    No history of anesthetic complications          ROS/MED HX    ENT/Pulmonary:     (+)tobacco use, Current use 0.2 packs/day  , . .   (-) recent URI   Neurologic:     (+)other neuro fetal alcohol disorder, very mild    Cardiovascular:  - neg cardiovascular ROS   (+) ----. : . . . :. . Previous cardiac testing date:results:date: " results:ECG reviewed date:8/2019 results:NSR date: results:          METS/Exercise Tolerance:  >4 METS   Hematologic:  - neg hematologic  ROS      (-) history of blood clots and History of Transfusion   Musculoskeletal:   (+)  other musculoskeletal- left suprapubic rami lesion      GI/Hepatic:  - neg GI/hepatic ROS       Renal/Genitourinary:     (+) Other Renal/ Genitourinary, LUTS      Endo:  - neg endo ROS       Psychiatric:     (+) psychiatric history anxiety and depression      Infectious Disease:  - neg infectious disease ROS      (-) Recent Fever   Malignancy:      - no malignancy   Other:    (+) no H/O Chronic Pain,                       PHYSICAL EXAM:   Mental Status/Neuro: A/A/O; Age Appropriate   Airway: Facies: Feasible  Mallampati: I  Mouth/Opening: Full  TM distance: > 6 cm  Neck ROM: Full   Respiratory: Auscultation: CTAB     Resp. Rate: Normal     Resp. Effort: Normal      CV: Rhythm: Regular  Rate: Age appropriate  Heart: Normal Sounds  Edema: None   Comments: Multiple lower missing teeth, 1 lower middle loose tooth     Dental: Details                Assessment:   ASA SCORE: 2            Plan:   Anes. Type:  General   Pre-Medication: None   Induction:  IV (Standard)   Airway: ETT; Oral   Access/Monitoring: PIV   Maintenance: Balanced     Postop Plan:   Postop Pain: Opioids  Postop Sedation/Airway: Not planned     PONV Management:   Adult Risk Factors:, Postop Opioids       Comments for Plan/Consent:  25 yo male with PMH of fetal alcohol syndrome and smoking. ASA 2. Plan GETTA.               PAC Discussion and Assessment    ASA Classification: 1  Case is suitable for: [facility]  Anesthetic techniques and relevant risks discussed: GA  Invasive monitoring and risk discussed:   Types:   Possibility and Risk of blood transfusion discussed:   NPO instructions given:   Additional anesthetic preparation and risks discussed:   Needs early admission to pre-op area:   Other:     PAC Resident/NP Anesthesia  Assessment:  Luis Muñoz is a 24 year old male scheduled for an Open biopsy left superior pubic ramus on 6/23/20 by Dr. Mendoza in evaluation of a bone lesion.  PAC referral for risk assessment and optimization for anesthesia with comorbid conditions of: tobacco use and fetal alcohol spectrum disorder, anxiety and depression.    Pre-operative considerations:  1.  Cardiac:  Functional status- METS >4.  Prior to covid 19 he was working out regularly at the gym doing cardio and weights.  He has no known cardiac conditions.  Intermediate risk surgery with 0.4% risk of major adverse cardiac event.   2.  Pulm:  Airway feasible.  BARI risk: low.  He quit smoking for 2 months prior to covid restrictions by working out daily at the gym, but then when he couldn't go to the gym anymore he started smoking again.  He notes that he is interested in quitting again and has cut down to just 3-4 cigarettes per day.    3.  GI:  Risk of PONV score = 1.  If > 2, anti-emetic intervention recommended.  4. :  He is on cialis daily for multiple LUTS.  Hold cialis 24 hours prior to surgery.  5. Neuropsych: He is diagnosed with fetal alcohol syndrome, but notes it is very mild and his only symptom that he has ever noticed is a mild learning disability.      VTE risk: 0.5%    Patient is optimized and is acceptable candidate for the proposed procedure.  No further diagnostic evaluation is needed.     **For further details of assessment, testing, and physical exam please see H and P completed on same date.          Idalia Latham DNP, RN, APRN      Reviewed and Signed by PAC Mid-Level Provider/Resident  Mid-Level Provider/Resident: Idalia Latham DNP, RN, APRN  Date: 6/19/20  Time: 0852    Attending Anesthesiologist Anesthesia Assessment:        Anesthesiologist:   Date:   Time:   Pass/Fail:   Disposition:     PAC Pharmacist Assessment:        Pharmacist:   Date:   Time:    ARIN Miles CNP

## 2020-06-19 NOTE — H&P
Pre-Operative H & P     CC:  Preoperative exam to assess for increased cardiopulmonary risk while undergoing surgery and anesthesia.    Date of Encounter: 6/19/2020  Primary Care Physician:  Franchesca Rubio  Associated diagnosis:  Left superior pubic ramus lesion    HPI  Luis Muñoz is a 24 year old male who presents for pre-operative H & P in preparation tiffany Open biopsy left superior pubic ramus on 6/23/20 by Dr. Mendoza at [facility].     Luis Muñoz is a 24 year old male with tobacco use and fetal alcohol spectrum disorder, anxiety and depression that has a left superior pubic ramus lesion.  Last fall her started having multiple abnormal  symptoms.  He sought evaluation with urology and imaging was done for evaluation which led to an incidental finding of this bony lesion.  He was subsequently referred here to Dr. Mendoza for consultation.  The above listed procedure has now been recommended for evaluation.      History is obtained from the patient and the medical record.     Past Medical History  Past Medical History:   Diagnosis Date     Fetal alcohol spectrum disorder        Past Surgical History  Past Surgical History:   Procedure Laterality Date     ORTHOPEDIC SURGERY  2014    right knee,        Hx of Blood transfusions/reactions: none    Hx of abnormal bleeding or anti-platelet use: none      Steroid use in the last year: none    Personal or FH with difficulty with Anesthesia:  none    Prior to Admission Medications  Current Outpatient Medications   Medication Sig Dispense Refill     tadalafil (CIALIS) 5 MG tablet Take 5 mg by mouth every evening          Allergies  No Known Allergies    Social History  Social History     Socioeconomic History     Marital status:      Spouse name: Not on file     Number of children: 2     Years of education: Not on file     Highest education level: Not on file   Occupational History     Occupation: [employer] -    Social Needs     Financial resource strain: Not on file     Food insecurity     Worry: Not on file     Inability: Not on file     Transportation needs     Medical: Not on file     Non-medical: Not on file   Tobacco Use     Smoking status: Current Every Day Smoker     Packs/day: 0.20     Years: 8.00     Pack years: 1.60     Types: Cigarettes     Smokeless tobacco: Never Used   Substance and Sexual Activity     Alcohol use: No     Drug use: Not Currently     Sexual activity: Not on file   Lifestyle     Physical activity     Days per week: Not on file     Minutes per session: Not on file     Stress: Not on file   Relationships     Social connections     Talks on phone: Not on file     Gets together: Not on file     Attends Mandaeism service: Not on file     Active member of club or organization: Not on file     Attends meetings of clubs or organizations: Not on file     Relationship status: Not on file     Intimate partner violence     Fear of current or ex partner: Not on file     Emotionally abused: Not on file     Physically abused: Not on file     Forced sexual activity: Not on file   Other Topics Concern     Not on file   Social History Narrative     Not on file       Family History  Family History   Adopted: Yes               ROS/MED HX  The complete review of systems is negative other than noted in the HPI or here.   ENT/Pulmonary:     (+)tobacco use, Current use 0.2 packs/day  , . .   (-) recent URI   Neurologic:     (+)other neuro fetal alcohol disorder, very mild    Cardiovascular:  - neg cardiovascular ROS   (+) ----. : . . . :. . Previous cardiac testing date:results:date: results:ECG reviewed date:8/2019 results:NSR date: results:          METS/Exercise Tolerance:  >4 METS   Hematologic:  - neg hematologic  ROS      (-) history of blood clots and History of Transfusion   Musculoskeletal:   (+)  other musculoskeletal- left suprapubic rami lesion      GI/Hepatic:  - neg GI/hepatic  "ROS       Renal/Genitourinary:     (+) Other Renal/ Genitourinary, LUTS      Endo:  - neg endo ROS       Psychiatric:     (+) psychiatric history anxiety and depression      Infectious Disease:  - neg infectious disease ROS      (-) Recent Fever   Malignancy:      - no malignancy   Other:    (+) no H/O Chronic Pain,                       PHYSICAL EXAM:   Mental Status/Neuro: A/A/O; Age Appropriate   Airway: Facies: Feasible  Mallampati: I  Mouth/Opening: Full  TM distance: > 6 cm  Neck ROM: Full   Respiratory: Auscultation: CTAB     Resp. Rate: Normal     Resp. Effort: Normal      CV: Rhythm: Regular  Rate: Age appropriate  Heart: Normal Sounds  Edema: None   Comments: Multiple lower missing teeth, 1 lower middle loose tooth     Dental: Details                Temp: 98.1  F (36.7  C) Temp src: Oral BP: 108/68 Pulse: 75   Resp: 14 SpO2: 98 %         151 lbs 0 oz  5' 10\"   Body mass index is 21.67 kg/m .       Physical Exam  Constitutional: Awake, alert, cooperative, no apparent distress, and appears stated age.  Eyes: Pupils equal, round and reactive to light, extra ocular muscles intact, sclera clear, conjunctiva normal.  HENT: Normocephalic, oral pharynx with moist mucus membranes, dentition as above. No goiter appreciated.   Respiratory: Clear to auscultation bilaterally, no crackles or wheezing.  Cardiovascular: Regular rate and rhythm, normal S1 and S2, and no murmur noted.  Carotids +2, no bruits. No edema. Palpable pulses to radial  DP and PT arteries.   GI: Normal bowel sounds, soft, non-distended, non-tender, no masses palpated, no hepatosplenomegaly.    Lymph/Hematologic: No cervical lymphadenopathy and no supraclavicular lymphadenopathy.  Genitourinary:  deferred  Skin: Warm and dry.    Musculoskeletal: Full ROM of neck. There is no redness, warmth, or swelling of the exposed joints. Gross motor strength is normal.    Neurologic: Awake, alert, oriented to name, place and time. Cranial nerves II-XII are " grossly intact. Gait is normal.   Neuropsychiatric: Calm, cooperative. Normal affect.     Labs: (personally reviewed)   Component      Latest Ref Rng & Units 2020   WBC      4.0 - 11.0 10e9/L 10.1    RBC Count      4.4 - 5.9 10e12/L 5.50    Hemoglobin      13.3 - 17.7 g/dL 16.4    Hematocrit      40.0 - 53.0 % 47.3    MCV      78 - 100 fl 86    MCH      26.5 - 33.0 pg 29.8    MCHC      31.5 - 36.5 g/dL 34.7    RDW      10.0 - 15.0 % 12.6    Platelet Count      150 - 450 10e9/L 170    Diff Method       Automated Method    % Neutrophils      % 79.3    % Lymphocytes      % 13.8    % Monocytes      % 4.8    % Eosinophils      % 1.4    % Basophils      % 0.5    % Immature Granulocytes      % 0.2    Nucleated RBCs      0 /100 0    Absolute Neutrophil      1.6 - 8.3 10e9/L 8.0    Absolute Lymphocytes      0.8 - 5.3 10e9/L 1.4    Absolute Monocytes      0.0 - 1.3 10e9/L 0.5    Absolute Eosinophils      0.0 - 0.7 10e9/L 0.1    Absolute Basophils      0.0 - 0.2 10e9/L 0.1    Abs Immature Granulocytes      0 - 0.4 10e9/L 0.0    Absolute Nucleated RBC       0.0    Sodium      133 - 144 mmol/L  138   Potassium      3.4 - 5.3 mmol/L  4.1   Chloride      94 - 109 mmol/L  107   Carbon Dioxide      20 - 32 mmol/L  25   Anion Gap      3 - 14 mmol/L  6   Glucose      70 - 99 mg/dL  87   Urea Nitrogen      7 - 30 mg/dL  11   Creatinine      0.66 - 1.25 mg/dL  0.87   GFR Estimate      >60 mL/min/1.73:m2  >90   GFR Estimate If Black      >60 mL/min/1.73:m2  >90   Calcium      8.5 - 10.1 mg/dL  9.3           EK2019  NSR      Outside records reviewed from:  Care Everywhere        ASSESSMENT and PLAN  Luis Muñoz is a 24 year old male scheduled for an Open biopsy left superior pubic ramus on 20 by Dr. Mendoza in evaluation of a bone lesion.  PAC referral for risk assessment and optimization for anesthesia with comorbid conditions of: tobacco use and fetal alcohol spectrum disorder, anxiety and  depression.    Pre-operative considerations:  1.  Cardiac:  Functional status- METS >4.  Prior to covid 19 he was working out regularly at the gym doing cardio and weights.  He has no known cardiac conditions.  Intermediate risk surgery with 0.4% risk of major adverse cardiac event.   2.  Pulm:  Airway feasible.  BARI risk: low.  He quit smoking for 2 months prior to covid restrictions by working out daily at the gym, but then when he couldn't go to the gym anymore he started smoking again.  He notes that he is interested in quitting again and has cut down to just 3-4 cigarettes per day.    3.  GI:  Risk of PONV score = 1.  If > 2, anti-emetic intervention recommended.  4. :  He is on cialis daily for multiple LUTS.  Hold cialis 24 hours prior to surgery.  5. Neuropsych: He is diagnosed with fetal alcohol syndrome, but notes it is very mild and his only symptom that he has ever noticed is a mild learning disability.      VTE risk: 0.5%    Patient is optimized and is acceptable candidate for the proposed procedure.  No further diagnostic evaluation is needed.         Idalia Latham DNP, RN, APRN  Preoperative Assessment Center  [facility]  [facility]  Phone: [phone]  Fax: [phone]

## 2020-06-19 NOTE — PROGRESS NOTES
SURGERY PLAN (PRE-OP PLAN)     Patient Position (indicated by x):  X  Supine     Supine with torso rolled up on a bump      Floppy lateral on torso length bean bag      Lateral decubitus, bean bag, full length      Lateral decubitus, Wixson hip positioner      Safety paddle side supports x 2 clamped to side rail      Lithotomy, both legs in yellow padded leg black      Lithotomy, single leg in yellow padded leg black      Prone on blanket rolls/round gel pad      Prone on Ketan (arched) frame on Car table      Single thigh in orange arthroscopy clamp      Beach chair semi recumbent      Arm out on radiolucent arm table    X  Split drape with top bar      Revision GLO drape with plastic side bags for leg     Extremity drape      Shoulder pack drape      Silva catheter          X  Fracture Table     Car x-ray table     Regular OR table              General Equipment Requests (indicated by x):    X   C-Arm with C-Armor drape      O-Arm with Stealth imaging    X  Reji Biopsy trephine set w/ K-wire & pituitary rongeurs   X  Small pituitary rongeur    X  Reji's angled curettes, narrow shaft      Bone graft, kapner gouges      Midas Mil Medtronic amada, electric motor      Phenol 5%      [place] BMAC stem cell      Vancomycin 1 gram powder      Zometa 4 mg vials      Depo Medrol steroid      Blunt Pelvic Retractor (.55, Blunt Hohmann with  slight bend)      (1) Portable hand held radiation detector machine for sentinel node biopsy and (2) Lymphazurin      [place] Osteotomes      Specimens and cultures (indicated by x):      Tissue cultures, aerobic and anaerobic without gram stain     Frozen section     pathology specimens - fresh    X  pathology specimens - formalin      Summary:    24-year-old male with a lesion of left superior pubic ramus of unknown etiology.    Plan:  Open biopsy of left superior pubic ramus     Lucas Altman MD  Orthopaedic Surgery, Adult Reconstruction Fellow  Pager  [phone]

## 2020-06-19 NOTE — TELEPHONE ENCOUNTER
FUTURE VISIT INFORMATION      SURGERY INFORMATION:    Date: 20    Location: ur or    Surgeon:  Koby Mendoza MD     Anesthesia Type:  general    Procedure: Open biopsy left superior pubic ramus     Consult: virtual visit 6/15    RECORDS REQUESTED FROM:       Primary Care Provider: Franchesca Rubio MD- [facility]     Most [facility] Tracin[facility]

## 2020-06-19 NOTE — PATIENT INSTRUCTIONS
Preparing for Your Surgery      Name:  Luis Muñoz   MRN:  [id number]   :  1995   Today's Date:  2020     Arriving for surgery:  Surgery date:  20  Arrival time:  5:30 am    Due to the COVID 19 crisis, we are trying to keep our patients safe from others who might have respiratory illnesses so the hospital is implementing a no visitor policy.  Also, at this time  parking is not available.  Please come to:     [facility] [address]  [address] [place], [place] [address]. [place]  [address]    - parking is available in front of [facility] from 5:15AM to 8:00PM. If you prefer, park your car in the Green Lot.    -Proceed to the 3rd floor, check in at the Adult Surgery Waiting Lounge. [phone]    If an escort is needed stop at the Information Desk in the lobby. Inform the information person that you are here for surgery. An escort to the Adult Surgery Waiting Lounge will be provided.    What can I eat or drink?  -  You may have solid food or milk products until 8 hours prior to your surgery (midnight).  -  You may have water, apple juice or 7up/Sprite until 2 hours prior to your surgery (5:30 am).    Which medicines can I take?  Hold Aspirin, vitamins and supplements one week prior to surgery.  Hold Ibuprofen for 24 hours and/or Naproxen for 48 hours prior to surgery.   Hold Tadalafil (Cialis) 24 hours prior to surgery.    How do I prepare myself?  -  Take two showers: one the night before surgery; and one the morning of surgery.         Use Scrubcare or Hibiclens to wash from neck down, leave soap on your skin for up to one minute.  Do not get soap in your eyes or ears.  You may use your own shampoo and conditioner; no other hair products.   -  Do NOT use lotion, powder, deodorant, or antiperspirant the day of your surgery.  -  Do NOT wear any jewelry.  -  Do not bring your own medications to the hospital.  -  Bring your ID and insurance card.    -If you  are scheduled to go home the Same Day as surgery you must have a responsible adult as a  and to stay with you overnight the first 24 hours after surgery.     Questions or Concerns:  -If you are scheduled on the East or West campus and have questions or concerns regarding the day of surgery, please call Preadmission Nursing at [phone].       -If you have health changes between today and your surgery please call your surgeon. For questions after surgery please call your surgeons office.       AFTER YOUR SURGERY  Breathing exercises   Breathing exercises help you recover faster. Take deep breaths and let the air out slowly. This will:     Help you wake up after surgery.    Help prevent complications like pneumonia.  Preventing complications will help you go home sooner.   We may give you a breathing device (incentive spirometer) to encourage you to breathe deeply.   Nausea and vomiting   You may feel sick to your stomach after surgery; if so, let your nurse know.    Pain control:  After surgery, you may have pain. Our goal is to help you manage your pain. Pain medicine will help you feel comfortable enough to do activities that will help you heal.  These activities may include breathing exercises, walking and physical therapy.   To help your health care team treat your pain we will ask: 1) If you have pain  2) where it is located 3) describe your pain in your words  Methods of pain control include medications given by mouth, vein or by nerve block for some surgeries.  Sequential Compression Device (SCD):  You may need to wear SCD S (also called pneumo boots)on your legs or feet. These are wraps connected to a machine that pumps in air and releases it. The repeated pumping helps prevent blood clots from forming.

## 2020-06-20 ENCOUNTER — AMBULATORY - HEALTHEAST (OUTPATIENT)
Dept: FAMILY MEDICINE | Facility: CLINIC | Age: 25
End: 2020-06-20

## 2020-06-20 DIAGNOSIS — Z11.59 ENCOUNTER FOR SCREENING FOR OTHER VIRAL DISEASES: ICD-10-CM

## 2020-06-23 ENCOUNTER — ANESTHESIA (OUTPATIENT)
Dept: SURGERY | Facility: CLINIC | Age: 25
End: 2020-06-23
Payer: COMMERCIAL

## 2020-06-23 ENCOUNTER — APPOINTMENT (OUTPATIENT)
Dept: GENERAL RADIOLOGY | Facility: CLINIC | Age: 25
End: 2020-06-23
Attending: ORTHOPAEDIC SURGERY
Payer: COMMERCIAL

## 2020-06-23 ENCOUNTER — HOSPITAL ENCOUNTER (OUTPATIENT)
Facility: CLINIC | Age: 25
Discharge: HOME | End: 2020-06-23
Attending: ORTHOPAEDIC SURGERY | Admitting: ORTHOPAEDIC SURGERY
Payer: COMMERCIAL

## 2020-06-23 VITALS
HEIGHT: 70 IN | DIASTOLIC BLOOD PRESSURE: 60 MMHG | WEIGHT: 150.35 LBS | RESPIRATION RATE: 16 BRPM | BODY MASS INDEX: 21.53 KG/M2 | HEART RATE: 58 BPM | SYSTOLIC BLOOD PRESSURE: 106 MMHG | OXYGEN SATURATION: 96 % | TEMPERATURE: 97.5 F

## 2020-06-23 DIAGNOSIS — M89.9 BONE LESION: ICD-10-CM

## 2020-06-23 LAB — GLUCOSE BLDC GLUCOMTR-MCNC: 90 MG/DL (ref 70–99)

## 2020-06-23 PROCEDURE — 88311 DECALCIFY TISSUE: CPT | Performed by: ORTHOPAEDIC SURGERY

## 2020-06-23 PROCEDURE — 25000128 H RX IP 250 OP 636: Performed by: NURSE ANESTHETIST, CERTIFIED REGISTERED

## 2020-06-23 PROCEDURE — 71000027 ZZH RECOVERY PHASE 2 EACH 15 MINS: Performed by: ORTHOPAEDIC SURGERY

## 2020-06-23 PROCEDURE — 40000170 ZZH STATISTIC PRE-PROCEDURE ASSESSMENT II: Performed by: ORTHOPAEDIC SURGERY

## 2020-06-23 PROCEDURE — 88307 TISSUE EXAM BY PATHOLOGIST: CPT | Mod: 26 | Performed by: ORTHOPAEDIC SURGERY

## 2020-06-23 PROCEDURE — 25000132 ZZH RX MED GY IP 250 OP 250 PS 637: Performed by: PHYSICIAN ASSISTANT

## 2020-06-23 PROCEDURE — 27210794 ZZH OR GENERAL SUPPLY STERILE: Performed by: ORTHOPAEDIC SURGERY

## 2020-06-23 PROCEDURE — 40000278 XR SURGERY CARM FLUORO LESS THAN 5 MIN: Mod: TC

## 2020-06-23 PROCEDURE — 36000059 ZZH SURGERY LEVEL 3 EA 15 ADDTL MIN UMMC: Performed by: ORTHOPAEDIC SURGERY

## 2020-06-23 PROCEDURE — 25000566 ZZH SEVOFLURANE, EA 15 MIN: Performed by: ORTHOPAEDIC SURGERY

## 2020-06-23 PROCEDURE — 88311 DECALCIFY TISSUE: CPT | Mod: 26 | Performed by: ORTHOPAEDIC SURGERY

## 2020-06-23 PROCEDURE — 88307 TISSUE EXAM BY PATHOLOGIST: CPT | Performed by: ORTHOPAEDIC SURGERY

## 2020-06-23 PROCEDURE — 25800030 ZZH RX IP 258 OP 636: Performed by: NURSE ANESTHETIST, CERTIFIED REGISTERED

## 2020-06-23 PROCEDURE — 87070 CULTURE OTHR SPECIMN AEROBIC: CPT | Performed by: ORTHOPAEDIC SURGERY

## 2020-06-23 PROCEDURE — 87186 SC STD MICRODIL/AGAR DIL: CPT | Performed by: ORTHOPAEDIC SURGERY

## 2020-06-23 PROCEDURE — 71000014 ZZH RECOVERY PHASE 1 LEVEL 2 FIRST HR: Performed by: ORTHOPAEDIC SURGERY

## 2020-06-23 PROCEDURE — 36000061 ZZH SURGERY LEVEL 3 W FLUORO 1ST 30 MIN - UMMC: Performed by: ORTHOPAEDIC SURGERY

## 2020-06-23 PROCEDURE — 25000125 ZZHC RX 250: Performed by: NURSE ANESTHETIST, CERTIFIED REGISTERED

## 2020-06-23 PROCEDURE — 87075 CULTR BACTERIA EXCEPT BLOOD: CPT | Performed by: ORTHOPAEDIC SURGERY

## 2020-06-23 PROCEDURE — 25000132 ZZH RX MED GY IP 250 OP 250 PS 637: Performed by: ANESTHESIOLOGY

## 2020-06-23 PROCEDURE — 25000125 ZZHC RX 250: Performed by: ORTHOPAEDIC SURGERY

## 2020-06-23 PROCEDURE — 87077 CULTURE AEROBIC IDENTIFY: CPT | Performed by: ORTHOPAEDIC SURGERY

## 2020-06-23 PROCEDURE — 82962 GLUCOSE BLOOD TEST: CPT

## 2020-06-23 PROCEDURE — 37000009 ZZH ANESTHESIA TECHNICAL FEE, EACH ADDTL 15 MIN: Performed by: ORTHOPAEDIC SURGERY

## 2020-06-23 PROCEDURE — 25800030 ZZH RX IP 258 OP 636: Performed by: ANESTHESIOLOGY

## 2020-06-23 PROCEDURE — 37000008 ZZH ANESTHESIA TECHNICAL FEE, 1ST 30 MIN: Performed by: ORTHOPAEDIC SURGERY

## 2020-06-23 PROCEDURE — 25000128 H RX IP 250 OP 636: Performed by: ANESTHESIOLOGY

## 2020-06-23 PROCEDURE — 71000015 ZZH RECOVERY PHASE 1 LEVEL 2 EA ADDTL HR: Performed by: ORTHOPAEDIC SURGERY

## 2020-06-23 RX ORDER — OXYCODONE HYDROCHLORIDE 5 MG/1
5-10 TABLET ORAL EVERY 4 HOURS PRN
Qty: 10 TABLET | Refills: 0 | Status: SHIPPED | OUTPATIENT
Start: 2020-06-23

## 2020-06-23 RX ORDER — LIDOCAINE HYDROCHLORIDE 20 MG/ML
INJECTION, SOLUTION INFILTRATION; PERINEURAL PRN
Status: DISCONTINUED | OUTPATIENT
Start: 2020-06-23 | End: 2020-06-23

## 2020-06-23 RX ORDER — CEFAZOLIN SODIUM 1 G/3ML
INJECTION, POWDER, FOR SOLUTION INTRAMUSCULAR; INTRAVENOUS PRN
Status: DISCONTINUED | OUTPATIENT
Start: 2020-06-23 | End: 2020-06-23

## 2020-06-23 RX ORDER — GABAPENTIN 300 MG/1
300 CAPSULE ORAL
Status: COMPLETED | OUTPATIENT
Start: 2020-06-23 | End: 2020-06-23

## 2020-06-23 RX ORDER — DEXAMETHASONE SODIUM PHOSPHATE 4 MG/ML
INJECTION, SOLUTION INTRA-ARTICULAR; INTRALESIONAL; INTRAMUSCULAR; INTRAVENOUS; SOFT TISSUE PRN
Status: DISCONTINUED | OUTPATIENT
Start: 2020-06-23 | End: 2020-06-23

## 2020-06-23 RX ORDER — ONDANSETRON 4 MG/1
4 TABLET, ORALLY DISINTEGRATING ORAL EVERY 30 MIN PRN
Status: DISCONTINUED | OUTPATIENT
Start: 2020-06-23 | End: 2020-06-23 | Stop reason: HOSPADM

## 2020-06-23 RX ORDER — ONDANSETRON 2 MG/ML
INJECTION INTRAMUSCULAR; INTRAVENOUS PRN
Status: DISCONTINUED | OUTPATIENT
Start: 2020-06-23 | End: 2020-06-23

## 2020-06-23 RX ORDER — SODIUM CHLORIDE, SODIUM LACTATE, POTASSIUM CHLORIDE, CALCIUM CHLORIDE 600; 310; 30; 20 MG/100ML; MG/100ML; MG/100ML; MG/100ML
INJECTION, SOLUTION INTRAVENOUS CONTINUOUS PRN
Status: DISCONTINUED | OUTPATIENT
Start: 2020-06-23 | End: 2020-06-23

## 2020-06-23 RX ORDER — ONDANSETRON 2 MG/ML
4 INJECTION INTRAMUSCULAR; INTRAVENOUS EVERY 30 MIN PRN
Status: DISCONTINUED | OUTPATIENT
Start: 2020-06-23 | End: 2020-06-23 | Stop reason: HOSPADM

## 2020-06-23 RX ORDER — BUPIVACAINE HYDROCHLORIDE AND EPINEPHRINE 2.5; 5 MG/ML; UG/ML
INJECTION, SOLUTION INFILTRATION; PERINEURAL PRN
Status: DISCONTINUED | OUTPATIENT
Start: 2020-06-23 | End: 2020-06-23 | Stop reason: HOSPADM

## 2020-06-23 RX ORDER — FENTANYL CITRATE 50 UG/ML
25-50 INJECTION, SOLUTION INTRAMUSCULAR; INTRAVENOUS
Status: DISCONTINUED | OUTPATIENT
Start: 2020-06-23 | End: 2020-06-23 | Stop reason: HOSPADM

## 2020-06-23 RX ORDER — SODIUM CHLORIDE, SODIUM LACTATE, POTASSIUM CHLORIDE, CALCIUM CHLORIDE 600; 310; 30; 20 MG/100ML; MG/100ML; MG/100ML; MG/100ML
INJECTION, SOLUTION INTRAVENOUS CONTINUOUS
Status: DISCONTINUED | OUTPATIENT
Start: 2020-06-23 | End: 2020-06-23 | Stop reason: HOSPADM

## 2020-06-23 RX ORDER — MEPERIDINE HYDROCHLORIDE 25 MG/ML
12.5 INJECTION INTRAMUSCULAR; INTRAVENOUS; SUBCUTANEOUS
Status: DISCONTINUED | OUTPATIENT
Start: 2020-06-23 | End: 2020-06-23 | Stop reason: HOSPADM

## 2020-06-23 RX ORDER — ACETAMINOPHEN 325 MG/1
975 TABLET ORAL ONCE
Status: COMPLETED | OUTPATIENT
Start: 2020-06-23 | End: 2020-06-23

## 2020-06-23 RX ORDER — PROPOFOL 10 MG/ML
INJECTION, EMULSION INTRAVENOUS PRN
Status: DISCONTINUED | OUTPATIENT
Start: 2020-06-23 | End: 2020-06-23

## 2020-06-23 RX ORDER — IBUPROFEN 600 MG/1
600 TABLET, FILM COATED ORAL ONCE
Status: COMPLETED | OUTPATIENT
Start: 2020-06-23 | End: 2020-06-23

## 2020-06-23 RX ORDER — NALOXONE HYDROCHLORIDE 0.4 MG/ML
.1-.4 INJECTION, SOLUTION INTRAMUSCULAR; INTRAVENOUS; SUBCUTANEOUS
Status: DISCONTINUED | OUTPATIENT
Start: 2020-06-23 | End: 2020-06-23 | Stop reason: HOSPADM

## 2020-06-23 RX ORDER — FENTANYL CITRATE 50 UG/ML
INJECTION, SOLUTION INTRAMUSCULAR; INTRAVENOUS PRN
Status: DISCONTINUED | OUTPATIENT
Start: 2020-06-23 | End: 2020-06-23

## 2020-06-23 RX ORDER — OXYCODONE HYDROCHLORIDE 5 MG/1
5 TABLET ORAL EVERY 4 HOURS PRN
Status: DISCONTINUED | OUTPATIENT
Start: 2020-06-23 | End: 2020-06-23 | Stop reason: HOSPADM

## 2020-06-23 RX ADMIN — SODIUM CHLORIDE, POTASSIUM CHLORIDE, SODIUM LACTATE AND CALCIUM CHLORIDE: 600; 310; 30; 20 INJECTION, SOLUTION INTRAVENOUS at 08:02

## 2020-06-23 RX ADMIN — PROCHLORPERAZINE EDISYLATE 10 MG: 5 INJECTION INTRAMUSCULAR; INTRAVENOUS at 10:05

## 2020-06-23 RX ADMIN — CEFAZOLIN 2 G: 1 INJECTION, POWDER, FOR SOLUTION INTRAMUSCULAR; INTRAVENOUS at 09:04

## 2020-06-23 RX ADMIN — MIDAZOLAM 2 MG: 1 INJECTION INTRAMUSCULAR; INTRAVENOUS at 08:02

## 2020-06-23 RX ADMIN — IBUPROFEN 600 MG: 600 TABLET ORAL at 13:26

## 2020-06-23 RX ADMIN — ONDANSETRON 4 MG: 2 INJECTION INTRAMUSCULAR; INTRAVENOUS at 08:02

## 2020-06-23 RX ADMIN — FENTANYL CITRATE 50 MCG: 50 INJECTION, SOLUTION INTRAMUSCULAR; INTRAVENOUS at 08:20

## 2020-06-23 RX ADMIN — GABAPENTIN 300 MG: 300 CAPSULE ORAL at 07:04

## 2020-06-23 RX ADMIN — PROPOFOL 50 MG: 10 INJECTION, EMULSION INTRAVENOUS at 08:45

## 2020-06-23 RX ADMIN — OXYCODONE HYDROCHLORIDE 5 MG: 5 TABLET ORAL at 10:35

## 2020-06-23 RX ADMIN — SODIUM CHLORIDE, POTASSIUM CHLORIDE, SODIUM LACTATE AND CALCIUM CHLORIDE: 600; 310; 30; 20 INJECTION, SOLUTION INTRAVENOUS at 09:58

## 2020-06-23 RX ADMIN — SUGAMMADEX 140 MG: 100 INJECTION, SOLUTION INTRAVENOUS at 09:19

## 2020-06-23 RX ADMIN — ONDANSETRON 4 MG: 2 INJECTION INTRAMUSCULAR; INTRAVENOUS at 09:43

## 2020-06-23 RX ADMIN — ROCURONIUM BROMIDE 40 MG: 10 INJECTION INTRAVENOUS at 08:20

## 2020-06-23 RX ADMIN — DEXAMETHASONE SODIUM PHOSPHATE 8 MG: 4 INJECTION, SOLUTION INTRAMUSCULAR; INTRAVENOUS at 08:20

## 2020-06-23 RX ADMIN — PROPOFOL 150 MG: 10 INJECTION, EMULSION INTRAVENOUS at 08:19

## 2020-06-23 RX ADMIN — ACETAMINOPHEN 975 MG: 325 TABLET, FILM COATED ORAL at 07:05

## 2020-06-23 RX ADMIN — FENTANYL CITRATE 50 MCG: 50 INJECTION, SOLUTION INTRAMUSCULAR; INTRAVENOUS at 08:37

## 2020-06-23 RX ADMIN — LIDOCAINE HYDROCHLORIDE 100 MG: 20 INJECTION, SOLUTION INFILTRATION; PERINEURAL at 08:19

## 2020-06-23 ASSESSMENT — MIFFLIN-ST. JEOR: SCORE: 1668.25

## 2020-06-23 NOTE — ANESTHESIA CARE TRANSFER NOTE
Patient: Luis Muñoz    Procedure(s):  Open biopsy left superior pubic ramus    Diagnosis: Bone lesion [M89.9]  Diagnosis Additional Information: No value filed.    Anesthesia Type:   General     Note:  Airway :Face Mask  Patient transferred to:PACU  Comments: Uneventful transport with facemask O2 4 lpm  Report to KARIE - Jelly Asencio  Exchanging well; color natl  Pt responds appropriately to command  Pt comfortable  IV patent  Lips/teeth/dentition as preop status  Questions answered  /73  HR 87 nsr  TAxillary 36.4  RR 12  Sat 98-99%  Handoff Report: Identifed the Patient, Identified the Reponsible Provider, Reviewed the pertinent medical history, Discussed the surgical course, Reviewed Intra-OP anesthesia mangement and issues during anesthesia, Set expectations for post-procedure period and Allowed opportunity for questions and acknowledgement of understanding      Vitals: (Last set prior to Anesthesia Care Transfer)    CRNA VITALS  6/23/2020 0908 - 6/23/2020 0938      6/23/2020             Resp Rate (observed):  (!) 3                Electronically Signed By: ARIN CORADO CRNA  June 23, 2020  9:38 AM

## 2020-06-23 NOTE — OR NURSING
"Pt tolerates PO.  States \"feel better now but really tired\".  Nausea resolved.  Pain controlled.  Vital signs stable.  States \"would like to go home\".  Meets discharge criteria.  "

## 2020-06-23 NOTE — DISCHARGE INSTRUCTIONS
Same-Day Surgery   Adult Discharge Orders & Instructions     For 24 hours after surgery:  1. Get plenty of rest.  A responsible adult must stay with you for at least 24 hours after you leave the hospital.   2. Pain medication can slow your reflexes. Do not drive or use heavy equipment.  If you have weakness or tingling, don't drive or use heavy equipment until this feeling goes away.  3. Mixing alcohol and pain medication can cause dizziness and slow your breathing. It can even be fatal. Do not drink alcohol while taking pain medication.  4. Avoid strenuous or risky activities.  Ask for help when climbing stairs.   5. You may feel lightheaded.  If so, sit for a few minutes before standing.  Have someone help you get up.   6. If you have nausea (feel sick to your stomach), drink only clear liquids such as apple juice, ginger ale, broth or 7-Up.  Rest may also help.  Be sure to drink enough fluids.  Move to a regular diet as you feel able. Take pain medications with a small amount of solid food, such as toast or crackers, to avoid nausea.   7. A slight fever is normal. Call the doctor if your fever is over 100 F (37.7 C) (taken under the tongue) or lasts longer than 24 hours.  8. You may have a dry mouth, muscle aches, trouble sleeping or a sore throat.  These symptoms should go away after 24 hours.  9. Do not make important or legal decisions.   Pain Management:      1. Take pain medication (if prescribed) for pain as directed by your physician.        2. WARNING: If the pain medication you have been prescribed contains Tylenol  (acetaminophen), DO NOT take additional doses of Tylenol (acetaminophen).     Call your doctor for any of the followin.  Signs of infection (fever, growing tenderness at the surgery site, severe pain, a large amount of drainage or bleeding, foul-smelling drainage, redness, swelling).    2.  It has been over 8 to 10 hours since surgery and you are still not able to urinate (pee).    3.   Headache for over 24 hours.    4.  Numbness, tingling or weakness the day after surgery (if you had spinal anesthesia).  To contact a doctor, call Dr. Mendoza or:      [phone] and ask for the Resident On Call for:         Adult Orthopedics (answered 24 hours a day)      Emergency Department:  [place] [place] [facility]: [phone]  [place] [facility]: [phone]

## 2020-06-23 NOTE — ANESTHESIA POSTPROCEDURE EVALUATION
Anesthesia POST Procedure Evaluation    Patient: Luis Muñoz   MRN:     [id number] Gender:   male   Age:    24 year old :      1995        Preoperative Diagnosis: Bone lesion [M89.9]   Procedure(s):  Open biopsy left superior pubic ramus   Postop Comments: No value filed.     Anesthesia Type: General     JZG FV AN POST EVALUATION    Last Anesthesia Record Vitals:  CRNA VITALS  2020 0909 - 2020 1009      2020             Resp Rate (observed):  (!) 3          Last PACU Vitals:  Vitals Value Taken Time   BP 99/53 2020 11:15 AM   Temp 36.6  C (97.9  F) 2020 10:00 AM   Pulse 57 2020 11:15 AM   Resp 16 2020 11:15 AM   SpO2 96 % 2020 11:15 AM   Temp src     NIBP     Pulse     SpO2     Resp     Temp     Ht Rate     Temp 2     Vitals shown include unvalidated device data.      Electronically Signed By: Sheryl Ding MD, 2020, 2:18 PM

## 2020-06-23 NOTE — OP NOTE
[facility], [place]    Operative Note    Pre-operative diagnosis: 24-year-old male with a lesion of left superior pubic ramus of unknown etiology.  Post-operative diagnosis Same as pre-operative diagnosis    Procedure: Procedure(s):  Open biopsy left superior pubic ramus  Surgeon: Surgeon(s) and Role:     * Koby Mendoza MD - Primary     * Lucas Altman MD - Fellow - Assisting  Anesthesia: General   Estimated blood loss: Minimal  Drains: None  Specimens:   ID Type Source Tests Collected by Time Destination   1 : Left Superior Ramus Tissue Groin ANAEROBIC BACTERIAL CULTURE, TISSUE CULTURE AEROBIC BACTERIAL Koby Mendoza MD 6/23/2020  9:03 AM    A : Left Superior Ramus Tissue Groin SURGICAL PATHOLOGY EXAM Koby Mendoza MD 6/23/2020  8:52 AM      Findings:   Uncomplicated open biopsy of left superior pubic ramus  Complications: None.  Implants: * No implants in log *      Decription of procedure: Patient was seen in the preoperative holding where procedure, risks and complications, expectations and rehabilitation were discussed once more.  Patient understands and agrees to the treatment plan as set forth.  Informed consent was obtained and the left superior pubic ramus was marked.  Patient was then taken to the operating room where general anesthesia was induced.  Patient was positioned supine.      The pubic region was prepped and draped in usual sterile fashion.  After the completion of a timeout procedure positioning of the incision was aided and verified by means of fluoroscopy.  A horizontal incision over the left superior pubic ramus was made.  The subcutaneous tissue and the overlying musculature were reflected.  Now using a #15 blade the periosteum overlying the bone was incised and reflected.  Using 1/4 inch osteotome a small window was created.  Using a curette multiple samples were taken.  Specimen was sent for aerobic and anaerobic cultures and for  histopathology testing.  Adequacy of the position of the biopsy was verified by means of fluoroscopy.  Once this was completed the wound was copiously irrigated.  A small Surgifoam was left inside the pubic ramus.  The periosteum overlying the created window was closed using 2-0 interrupted Vicryl sutures.  The subcutaneous tissue was closed using interrupted 2-0 Vicryl sutures.  The skin was closed using a 3-0 PDS.  Dermabond was used to seal the wound.  Wound was then draped with a Tegaderm.  Patient was awakened and returned to the recovery in good condition.    Postoperative plan:  Weightbearing as tolerated  Pain medication PRN  Virtual clinic visit for wound check and results of histopathology testing Dr. Mendoza in 2 weeks  Discharge today      Lucas Altman MD  [facility] Arthroplasty Fellow          Attending MD (Dr. Koby Mendoza) Attestation:  I was present during the key portions of the procedure and I was immediately available for the entire procedure between opening and closing.    MD Jemal Max Family Professor  Oncology and Adult Reconstructive Surgery  Dept Orthopaedic Surgery, [facility]

## 2020-06-23 NOTE — OR NURSING
"Arrived PACU per cart.  Pt awake and moving head around.  Denies pain but complains of \"nausea\".  Incision clear and intact.  Moves all fours.   Lungs clear to bases.  Medicated for nausea.  "

## 2020-06-25 ENCOUNTER — TELEPHONE (OUTPATIENT)
Dept: ORTHOPEDICS | Facility: CLINIC | Age: 25
End: 2020-06-25

## 2020-06-25 LAB
BACTERIA SPEC CULT: ABNORMAL
SPECIMEN SOURCE: ABNORMAL

## 2020-06-25 NOTE — TELEPHONE ENCOUNTER
This writer called the patient. Pt. Reported so bruising to this surgical site and to his penis. This writer explained that it could be due to positioning in the OR. He will continue to monitor. Recommend cold compress to the bruised areas. Pt. denied fever, drainage, or pus. He had the surgical dressing intact    Message sent to schedulers about 2 week POP which was set up.    Kori Rios RN on 6/25/2020 at 3:36 PM

## 2020-06-25 NOTE — TELEPHONE ENCOUNTER
[facility] Call Center    Phone Message    May a detailed message be left on voicemail: yes     Reason for Call: Symptoms or Concerns     If patient has red-flag symptoms, warm transfer to triage line    concern: pelvic area bruising    Symptoms have been present for:  2 day(s)    Has patient previously been seen for this? No    By : Dr. Mendoza    Date: 6/23/20    Are there any new or worsening symptoms? Yes: Pt requesting call back to make sure that it is normal to have bruising in his pelvic area where he had surgery      Action Taken: Message routed to:  [facility] ortho

## 2020-06-28 ENCOUNTER — NURSE TRIAGE (OUTPATIENT)
Dept: NURSING | Facility: CLINIC | Age: 25
End: 2020-06-28

## 2020-06-29 ENCOUNTER — TELEPHONE (OUTPATIENT)
Dept: ORTHOPEDICS | Facility: CLINIC | Age: 25
End: 2020-06-29

## 2020-06-29 LAB — COPATH REPORT: NORMAL

## 2020-06-29 NOTE — TELEPHONE ENCOUNTER
This writer contacted the patient. His biopsy results were discussed after Dr. Mendoza reviewed them.     He was pleased but still concerned about issues related to his sexual/urological health. May need to rule out medical condition or be referred to mental health clinic. Recommended follow up with [facility] urology like Dr. Mendoza recommended. Referral in. Contacted scheduling office to get patient set up by sending a message to their team with patient's contact number.    Kori Rios RN on 6/29/2020 at 12:33 PM

## 2020-06-29 NOTE — TELEPHONE ENCOUNTER
Had open bone biopsy surgery on Tuesday - states the site is tingly and super itchy/uncomfortable. States there is swelling at the site.    Paged on-call: Dr. Kathleen Nieto at [phone] - called patient back and confirmed that he has spoke with Dr. Nieto - no further questions at this time.    COVID 19 Nurse Triage Plan/Patient Instructions    Please be aware that novel coronavirus (COVID-19) may be circulating in the community. If you develop symptoms such as fever, cough, or SOB or if you have concerns about the presence of another infection including coronavirus (COVID-19), please contact your health care provider or visit [email or web address].     Disposition/Instructions    Patient to stay at home and follow home care protocol based instructions.     Thank you for taking steps to prevent the spread of this virus.  o Limit your contact with others.  o Wear a simple mask to cover your cough.  o Wash your hands well and often.    Resources    [facility]: About COVID-19: [email or web address]    CDC: What to Do If You're Sick: www.cdc.gov/coronavirus/2019-ncov/about/steps-when-sick.html    CDC: Ending Home Isolation: www.cdc.gov/coronavirus/2019-ncov/hcp/disposition-in-home-patients.html     CDC: Caring for Someone: www.cdc.gov/coronavirus/2019-ncov/if-you-are-sick/care-for-someone.html     [facility]: Interim Guidance for Hospital Discharge to Home: www.health.[facility].[place]/diseases/coronavirus/hcp/hospdischarge.pdf    [facility] clinical trials (COVID-19 research studies): clinicalaffairs.[facility]/umn-clinical-trials     Below are the COVID-19 hotlines at the [facility] of [facility] ([facility]). Interpreters are available.   o For health questions: Call 690-576-5525 or 1-411.537.4685 (7 a.m. to 7 p.m.)  o For questions about schools and childcare: Call 434-767-8509 or 1-285.464.3605 (7 a.m. to 7 p.m.)     Perla Sorto RN on 6/28/2020 at 9:56 PM    Reason for Disposition    [1] Caller has URGENT  question AND [2] triager unable to answer question    Additional Information    Negative: [1] Major abdominal surgical incision AND [2] wound gaping open AND [3] visible internal organs    Negative: Sounds like a life-threatening emergency to the triager    Negative: [1] Bleeding from incision AND [2] won't stop after 10 minutes of direct pressure    Negative: [1] Widespread rash AND [2] bright red, sunburn-like    Negative: Severe pain in the incision    Negative: [1] Incision gaping open AND [2] < 48 hours since wound re-opened    Negative: [1] Incision gaping open AND [2] length of opening > 2 inches (5 cm)    Negative: Patient sounds very sick or weak to the triager    Negative: Sounds like a serious complication to the triager    Negative: Fever > 100.4 F (38.0 C)    Negative: [1] Incision looks infected (spreading redness, pain) AND [2] fever > 99.5 F (37.5 C)    Negative: [1] Incision looks infected (spreading redness, pain) AND [2] large red area (> 2 in. or 5 cm)    Negative: [1] Incision looks infected (spreading redness, pain) AND [2] face wound    Negative: [1] Red streak runs from the incision AND [2] longer than 1 inch (2.5 cm)    Negative: [1] Pus or bad-smelling fluid draining from incision AND [2] no fever    Negative: [1] Post-op pain AND [2] not controlled with pain medications    Negative: Dressing soaked with blood or body fluid (e.g., drainage)    Negative: [1] Raised bruise and [2] size > 2 inches (5 cm) and expanding    Protocols used: POST-OP INCISION SYMPTOMS-A-AH

## 2020-06-29 NOTE — RESULT ENCOUNTER NOTE
To Kori Rios RN (or clinic staff),    The results were reviewed.  Please notify the patient of any abnormal results.  Tell him there is no evidence of any cancerous process.  Diagnosis is fibrous dysplasia, a benign bone condition no further treatment required.    Koby Mendoza MD  6/29/2020  10:17 AM

## 2020-06-29 NOTE — TELEPHONE ENCOUNTER
Orthopaedic Brief Note    Paged by triage nurse. Patient is now s/p open biopsy of left superior pubic ramus 6/23/20 with Dr. Mendoza. He is calling regarding redness, itching, and numbness around incision site. Denies fever, chills, or drainage. Pain well controlled; he takes occasional ibuprofen. Patient also distressed about trouble with urological symptoms that have been ongoing for quite some time.    Provided patient reassurance on incision; sometimes skin can get irritated from adhesive dressings or friction from clothing. Discussed that benadryl may help with skin irritation, and he can continue taking ibuprofen as needed. Patient asked about Vaseline gel; I stated that would be okay as well, but if his skin becomes more irritated he should back off. Provided counseling that with warning signs of fever, chills, or drainage, patient should call back or come in to ED.     Patient with f/u with Dr. Mendoza 7/9/20. We discussed that histopathology should be available at that time for review and further planning. Patient discussed getting a Urology consult (for second opinion) through the [facility], which can be set up as appropriate at that visit as well.    Questions answered; patient agreeable with plan.    Kathleen Nieto MD  Orthopaedic Surgery, PGY4

## 2020-06-30 LAB
BACTERIA SPEC CULT: NORMAL
Lab: NORMAL
SPECIMEN SOURCE: NORMAL

## 2020-07-01 ENCOUNTER — TELEPHONE (OUTPATIENT)
Dept: ORTHOPEDICS | Facility: CLINIC | Age: 25
End: 2020-07-01

## 2020-07-01 NOTE — TELEPHONE ENCOUNTER
[facility] Call [facility]    Phone Message    May a detailed message be left on voicemail: yes     Reason for Call: Requesting Results   Name/type of test: biopsy, pt stated he got the results but is not sure he understands them and has some additional questions  Date of test: 6/23/20  Was test done at a location other than [facility] (Please fill in the location if not [facility])?: No      Action Taken: Message routed to:  [facility] ortho

## 2020-07-02 ENCOUNTER — TELEPHONE (OUTPATIENT)
Dept: ORTHOPEDICS | Facility: CLINIC | Age: 25
End: 2020-07-02

## 2020-07-02 NOTE — TELEPHONE ENCOUNTER
This writer talked to the patient and was able to spell out his diagnosis. Urology referral put in. Pt. will reach out if concerns.      Kori Rios RN on 7/2/2020 at 10:02 AM

## 2020-07-06 NOTE — TELEPHONE ENCOUNTER
MEDICAL RECORDS REQUEST   [facility]  Urology Clinic  [address] [place] [address]  PHONE: [phone]  Fax: [phone]        FUTURE VISIT INFORMATION                                                   Luis STONE Muñoz: 1995 scheduled for future visit at [facility]    APPOINTMENT INFORMATION:    Date: 20    Provider:  Francheska Casanova PA-C    Reason for Visit/Diagnosis: Incontinence     REFERRAL INFORMATION:    Referring provider:  Reji    Specialty: MD    Referring providers clinic:  [facility]    Clinic contact number:  N/A    RECORDS REQUESTED FOR VISIT                                                     NOTES  STATUS/DETAILS   OFFICE NOTE from referring provider  yes   OFFICE NOTE from other specialist  no   DISCHARGE SUMMARY from hospital  no   DISCHARGE REPORT from the ER  no   OPERATIVE REPORT  no   MEDICATION LIST  no   LABS     URINALYSIS (UA)  yes     PRE-VISIT CHECKLIST      Record collection complete Yes- Internal recs in epic    Appointment appropriately scheduled           (right time/right provider) Yes   MyChart activation If no, please explain: In process    Questionnaire complete If no, please explain: In process      Completed by: Bobbi Javier

## 2020-07-09 ENCOUNTER — OFFICE VISIT (OUTPATIENT)
Dept: ORTHOPEDICS | Facility: CLINIC | Age: 25
End: 2020-07-09
Payer: COMMERCIAL

## 2020-07-09 DIAGNOSIS — M85.00 FIBROUS DYSPLASIA, MONOSTOTIC: Primary | ICD-10-CM

## 2020-07-09 NOTE — LETTER
7/9/2020         RE: Luis Muñoz  [address]  [address]  [place] [place] [address]        Dear Colleague,    Thank you for referring your patient, Luis Muñoz, to the [facility]. Please see a copy of my visit note below.        [facility], Orthopaedic Oncology Surgery Consultation  by Koby Mendoza M.D.    Luis Muñoz MRN# [id number]   Age: 24 year old YOB: 1995     Requesting physician: Referred Self  No Ref-Primary, Physician  Dr. Chava Mejia, [place] [facility]  Dr. Keyur Rodriguez, [facility]       Diagnosis:  1.  Fibrous dysplasia, monostotic, of left superior pubic ramus    Treatments and procedures:  1.  June 23, 2020, open biopsy of left superior pubic ramus, (Reji) [facility]       I met with Luis today to discuss his pathology findings after performing the biopsy of the left superior pubic ramus lesion.  A diagnosis of fibrous dysplasia has been made.  Prior bone scan reveals that this is a solitary lesion.  Incidentally noted was a positive culture of his skin commensal organism which I believe is a contaminant and does not represent a true infection.    His incision is healing appropriately.    I informed Luis that patients with fibrous dysplasia can have bone pain however, I do not believe his urinary symptoms are related to the fibrous dysplasia of his pubic bone.    Complications from fibrous dysplasia usually involve deformity, bone pain or secondary neoplasms.  I see no indication for additional treatment or intervention at this time for Luis.     MD Jemal Max Family Professor  Oncology and Adult Reconstructive Surgery  Dept Orthopaedic Surgery, [facility]  [phone] office, [phone] pager  [email or web address]    Total Time = 15 min, 50% of which was spent in counseling and coordination of care as documented above.

## 2020-07-09 NOTE — NURSING NOTE
Chief Complaint   Patient presents with     Surgical Followup     Wound Check S/p Open biopsy left superior pubic ramus - Left DOS: 06/23/2020       24 year old  1995        [id number] - [place], [place] - [address] [place] [address] E AT [place] [address] & [facility]    No Known Allergies    Current Outpatient Medications   Medication     oxyCODONE (ROXICODONE) 5 MG tablet     tadalafil (CIALIS) 5 MG tablet     No current facility-administered medications for this visit.

## 2020-07-09 NOTE — PROGRESS NOTES
[facility], Orthopaedic Oncology Surgery Consultation  by Koby Mendoza M.D.    Luis Muñoz MRN# [id number]   Age: 24 year old YOB: 1995     Requesting physician: Referred Self  No Ref-Primary, Physician  Dr. Chava Mejia, [place] [facility]  Dr. Keyur Rodriguez, [facility]       Diagnosis:  1.  Fibrous dysplasia, monostotic, of left superior pubic ramus    Treatments and procedures:  1.  June 23, 2020, open biopsy of left superior pubic ramus, (Reji) [facility]       I met with Luis today to discuss his pathology findings after performing the biopsy of the left superior pubic ramus lesion.  A diagnosis of fibrous dysplasia has been made.  Prior bone scan reveals that this is a solitary lesion.  Incidentally noted was a positive culture of his skin commensal organism which I believe is a contaminant and does not represent a true infection.    His incision is healing appropriately.    I informed Luis that patients with fibrous dysplasia can have bone pain however, I do not believe his urinary symptoms are related to the fibrous dysplasia of his pubic bone.    Complications from fibrous dysplasia usually involve deformity, bone pain or secondary neoplasms.  I see no indication for additional treatment or intervention at this time for Luis.     MD Jemal Max Family Professor  Oncology and Adult Reconstructive Surgery  Dept Orthopaedic Surgery, [facility]  [phone] office, [phone] pager  [email or web address]    Total Time = 15 min, 50% of which was spent in counseling and coordination of care as documented above.

## 2020-07-15 ENCOUNTER — PRE VISIT (OUTPATIENT)
Dept: UROLOGY | Facility: CLINIC | Age: 25
End: 2020-07-15

## 2020-07-15 NOTE — TELEPHONE ENCOUNTER
Visit Type :  Incontinence     Records/Orders: in epic     Pt Contacted: no    At Rooming: video

## 2020-08-04 ENCOUNTER — VIRTUAL VISIT (OUTPATIENT)
Dept: UROLOGY | Facility: CLINIC | Age: 25
End: 2020-08-04
Payer: COMMERCIAL

## 2020-08-04 ENCOUNTER — PRE VISIT (OUTPATIENT)
Dept: UROLOGY | Facility: CLINIC | Age: 25
End: 2020-08-04

## 2020-08-04 DIAGNOSIS — R39.89 URETHRAL PAIN: Primary | ICD-10-CM

## 2020-08-04 NOTE — LETTER
"8/4/2020       RE: Luis Muñoz  [address]  [address]  [facility] [address]     Dear Colleague,    Thank you for referring your patient, Luis Muñoz, to the [facility] UROLOGY AND INST FOR PROSTATE AND UROLOGIC CANCERS at [facility]. Please see a copy of my visit note below.    Video Visit Technology for this patient: Supramed Video Visit- Patient was left in waiting room    Luis Muñoz is a 24 year old male who is being evaluated via a billable video visit.      The patient has been notified of following:     \"This video visit will be conducted via a call between you and your physician/provider. We have found that certain health care needs can be provided without the need for an in-person physical exam.  This service lets us provide the care you need with a video conversation.  If a prescription is necessary we can send it directly to your pharmacy.  If lab work is needed we can place an order for that and you can then stop by our lab to have the test done at a later time.    Video visits are billed at different rates depending on your insurance coverage.  Please reach out to your insurance provider with any questions.    If during the course of the call the physician/provider feels a video visit is not appropriate, you will not be charged for this service.\"    Patient has given verbal consent for Video visit? Yes  How would you like to obtain your AVS? MyChart  If you are dropped from the video visit, the video invite should be resent to:   Will anyone else be joining your video visit? No      CC: urethral pain    HPI: Mr. Luis Muñoz is a 24 year old male who is being evaluated via billable video visit in consultation from Dr. Mendoza for urethral pain. Per the patient, his urinary/pelvic symptoms started in November 2019 after he started taking an \"all natural male enhancement supplement.\" He took this for 2-3 weeks and then stopped. Developed symptoms " "including urinary frequency, dysuria, hesitancy, intermittency, penile discharge, and erection difficulties (which he attributes to pain). Symptoms persisted for a few months. He was then seen through [facility] and was diagnosed with prostatitis. He was prescribed an antibiotic which he took for 1 month and his symptoms improved. No longer has urinary symptoms or discharge. His main complaint now is a lingering \"burning or cold feeling\" in his urethra. This occurs intermittently. He also describes possible increased sensitivity on the underside of the penis as well as some \"stinging\" after ejaculation and a decreased volume of ejaculate that is more yellow in color.     He believes that his symptoms may be related to his ongoing issues with anxiety, depression, and performance anxiety. Working with his PCP on these issues who recommended Lexapro, but he is hesitant to take medication due to the possible sexual side effects. He does take Cialis 5 mg daily.    Also of note, he recently underwent biopsy of a left superior pubic ramus bone lesion, found incidentally on CT imaging. Pathology revealed fibrous dysplasia and no additional treatment or intervention was recommended.     Past Medical History:   Diagnosis Date     Fetal alcohol spectrum disorder      Past Surgical History:   Procedure Laterality Date     BIOPSY BONE PELVIS Left 6/23/2020    Procedure: Open biopsy left superior pubic ramus;  Surgeon: Koby Mendoza MD;  Location: [facility] OR     ORTHOPEDIC SURGERY  2014    right knee,      Current Outpatient Medications   Medication Sig Dispense Refill     oxyCODONE (ROXICODONE) 5 MG tablet Take 1-2 tablets (5-10 mg) by mouth every 4 hours as needed for moderate to severe pain (Patient not taking: Reported on 7/9/2020) 10 tablet 0     tadalafil (CIALIS) 5 MG tablet Take 5 mg by mouth every evening        No Known Allergies     FAMILY HISTORY:   Family History   Adopted: Yes       SOCIAL HISTORY:   Social " History     Tobacco Use     Smoking status: Current Every Day Smoker     Packs/day: 0.20     Years: 8.00     Pack years: 1.60     Types: Cigarettes     Smokeless tobacco: Never Used   Substance Use Topics     Alcohol use: No     Drug use: Not Currently       ROS: A comprehensive 14 point ROS was obtained and was negative except for that outlined above in the HPI.    PHYSICAL EXAM:   GENERAL: Healthy, alert and no distress  EYES: Eyes grossly normal to inspection.  No discharge or erythema, or obvious scleral/conjunctival abnormalities.  RESP: No audible wheeze, cough, or visible cyanosis.  No visible retractions or increased work of breathing.    SKIN: Visible skin clear. No significant rash, abnormal pigmentation or lesions.  NEURO: Cranial nerves grossly intact.  Mentation and speech appropriate for age.  PSYCH: Mentation appears normal, affect normal/bright, judgement and insight intact, normal speech and appearance well-groomed.    LABS:   1/14/2020 - UA: negative    IMAGING:   CT pelvis w/contrast   4/16/2020  1. No urinary tract abnormalities evident.  2. Findings suggest a degree of constipation.  3. 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     ASSESSMENT/PLAN:  Mr. Luis Muñoz is a 24 year old male with urethral discomfort. Previously had other associated urinary symptoms and discharge which resolved after a month long course of antibiotics, prescribed by outside urologist for possible prostatitis. CT pelvis in 4/2020 was unremarkable from a  standpoint, though did demonstrate increased stool burden. Certainly possibly that constipation is contributing to his urethral discomfort. Symptoms may also be related to anxiety/depression as pointed out by patient, or could have a musculoskeletal component. Discussed that we are somewhat limited in making a firm diagnosis without the ability to perform a  exam given the nature of the video visit, though discussed the  differentials above as well as management options to include observation with supportive cares, following up in clinic for an exam/office studies, and possible further evaluation with cystoscopy if needed in the future. At this time, he elects for the following:  -Push water and limit bladder irritants.  -NSAID medications PRN.  -If symptoms persist, follow up with me in clinic next available for exam, possible UA/UC, ureaplasma, mycoplasma, possible flow/PVR.   -Offered mental health referral or referral to [facility]. He declines for now.     Additional recommendations pending results of the above.     I have enjoyed participating in the medical care of this very pleasant patient.  Please don't hesitate to contact me with any questions or concerns.       Video-Visit Details    Type of service:  Video Visit    AmWell Video Start Time: 7:37 AM  AmWell Video End Time: 7:46 AM     Switched to [email or web address] due to connectivity problems with Quantu[employer] Platform.    Doximity Video Start Time: 7:46 AM  Doximity Video End Time: 8:05 AM    Originating Location (pt. Location): Home    Distant Location (provider location):  [facility] UROLOGY AND [facility] FOR PROSTATE AND UROLOGIC CANCERS     Platform used for Video Visit: Quantuvis and [email or web address]    Francheska Casanova PA-C

## 2020-08-04 NOTE — PROGRESS NOTES
"Video Visit Technology for this patient: [facility] Video Visit- Patient was left in waiting room    Luis Muñoz is a 24 year old male who is being evaluated via a billable video visit.      The patient has been notified of following:     \"This video visit will be conducted via a call between you and your physician/provider. We have found that certain health care needs can be provided without the need for an in-person physical exam.  This service lets us provide the care you need with a video conversation.  If a prescription is necessary we can send it directly to your pharmacy.  If lab work is needed we can place an order for that and you can then stop by our lab to have the test done at a later time.    Video visits are billed at different rates depending on your insurance coverage.  Please reach out to your insurance provider with any questions.    If during the course of the call the physician/provider feels a video visit is not appropriate, you will not be charged for this service.\"    Patient has given verbal consent for Video visit? Yes  How would you like to obtain your AVS? MyChart  If you are dropped from the video visit, the video invite should be resent to:   Will anyone else be joining your video visit? No      CC: urethral pain    HPI: Mr. Luis Muñoz is a 24 year old male who is being evaluated via billable video visit in consultation from Dr. Mendoza for urethral pain. Per the patient, his urinary/pelvic symptoms started in November 2019 after he started taking an \"all natural male enhancement supplement.\" He took this for 2-3 weeks and then stopped. Developed symptoms including urinary frequency, dysuria, hesitancy, intermittency, penile discharge, and erection difficulties (which he attributes to pain). Symptoms persisted for a few months. He was then seen through [facility] and was diagnosed with prostatitis. He was prescribed an antibiotic which he took for 1 month and his symptoms " "improved. No longer has urinary symptoms or discharge. His main complaint now is a lingering \"burning or cold feeling\" in his urethra. This occurs intermittently. He also describes possible increased sensitivity on the underside of the penis as well as some \"stinging\" after ejaculation and a decreased volume of ejaculate that is more yellow in color.     He believes that his symptoms may be related to his ongoing issues with anxiety, depression, and performance anxiety. Working with his PCP on these issues who recommended Lexapro, but he is hesitant to take medication due to the possible sexual side effects. He does take Cialis 5 mg daily.    Also of note, he recently underwent biopsy of a left superior pubic ramus bone lesion, found incidentally on CT imaging. Pathology revealed fibrous dysplasia and no additional treatment or intervention was recommended.     Past Medical History:   Diagnosis Date     Fetal alcohol spectrum disorder      Past Surgical History:   Procedure Laterality Date     BIOPSY BONE PELVIS Left 6/23/2020    Procedure: Open biopsy left superior pubic ramus;  Surgeon: Koby Mendoza MD;  Location: [facility] OR     ORTHOPEDIC SURGERY  2014    right knee,      Current Outpatient Medications   Medication Sig Dispense Refill     oxyCODONE (ROXICODONE) 5 MG tablet Take 1-2 tablets (5-10 mg) by mouth every 4 hours as needed for moderate to severe pain (Patient not taking: Reported on 7/9/2020) 10 tablet 0     tadalafil (CIALIS) 5 MG tablet Take 5 mg by mouth every evening        No Known Allergies     FAMILY HISTORY:   Family History   Adopted: Yes       SOCIAL HISTORY:   Social History     Tobacco Use     Smoking status: Current Every Day Smoker     Packs/day: 0.20     Years: 8.00     Pack years: 1.60     Types: Cigarettes     Smokeless tobacco: Never Used   Substance Use Topics     Alcohol use: No     Drug use: Not Currently       ROS: A comprehensive 14 point ROS was obtained and was negative except for " that outlined above in the HPI.    PHYSICAL EXAM:   GENERAL: Healthy, alert and no distress  EYES: Eyes grossly normal to inspection.  No discharge or erythema, or obvious scleral/conjunctival abnormalities.  RESP: No audible wheeze, cough, or visible cyanosis.  No visible retractions or increased work of breathing.    SKIN: Visible skin clear. No significant rash, abnormal pigmentation or lesions.  NEURO: Cranial nerves grossly intact.  Mentation and speech appropriate for age.  PSYCH: Mentation appears normal, affect normal/bright, judgement and insight intact, normal speech and appearance well-groomed.    LABS:   1/14/2020 - UA: negative    IMAGING:   CT pelvis w/contrast   4/16/2020  1. No urinary tract abnormalities evident.  2. Findings suggest a degree of constipation.  3. 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     ASSESSMENT/PLAN:  Mr. Luis Muñoz is a 24 year old male with urethral discomfort. Previously had other associated urinary symptoms and discharge which resolved after a month long course of antibiotics, prescribed by outside urologist for possible prostatitis. CT pelvis in 4/2020 was unremarkable from a  standpoint, though did demonstrate increased stool burden. Certainly possibly that constipation is contributing to his urethral discomfort. Symptoms may also be related to anxiety/depression as pointed out by patient, or could have a musculoskeletal component. Discussed that we are somewhat limited in making a firm diagnosis without the ability to perform a  exam given the nature of the video visit, though discussed the differentials above as well as management options to include observation with supportive cares, following up in clinic for an exam/office studies, and possible further evaluation with cystoscopy if needed in the future. At this time, he elects for the following:  -Push water and limit bladder irritants.  -NSAID medications PRN.  -If  symptoms persist, follow up with me in clinic next available for exam, possible UA/UC, ureaplasma, mycoplasma, possible flow/PVR.   -Offered mental health referral or referral to [facility]. He declines for now.     Additional recommendations pending results of the above.     I have enjoyed participating in the medical care of this very pleasant patient.  Please don't hesitate to contact me with any questions or concerns.       Video-Visit Details    Type of service:  Video Visit    AmWell Video Start Time: 7:37 AM  AmWell Video End Time: 7:46 AM     Switched to Reveal Technology due to connectivity problems with SevenLunches Platform.    Doximity Video Start Time: 7:46 AM  [employer] Video End Time: 8:05 AM    Originating Location (pt. Location): Home    Distant Location (provider location):  [facility] UROLOGY AND [facility] FOR PROSTATE AND UROLOGIC CANCERS     Platform used for Video Visit: SevenLunches and Reveal Technology    Francheska Casanova PA-C

## 2020-08-04 NOTE — PATIENT INSTRUCTIONS
UROLOGY CLINIC VISIT PATIENT INSTRUCTIONS    Follow up next available for an in person office visit and exam.     If you have any issues, questions or concerns in the meantime, do not hesitate to contact us at [phone] or via Paperfold.     It was a pleasure meeting with you today.  Thank you for allowing me and my team the privilege of caring for you today.  YOU are the reason we are here, and I truly hope we provided you with the excellent service you deserve.  Please let us know if there is anything else we can do for you so that we can be sure you are leaving completely satisfied with your care experience.

## 2020-08-05 ENCOUNTER — TELEPHONE (OUTPATIENT)
Dept: UROLOGY | Facility: CLINIC | Age: 25
End: 2020-08-05

## 2020-08-05 DIAGNOSIS — N52.9 ED (ERECTILE DYSFUNCTION): Primary | ICD-10-CM

## 2020-08-05 DIAGNOSIS — N41.9 PROSTATITIS: ICD-10-CM

## 2020-08-05 NOTE — TELEPHONE ENCOUNTER
----- Message from Francheska Casanova PA-C sent at 8/5/2020 11:21 AM CDT -----  Yep. I offered to give him a referral to the [facility]. We can place that now if he wants or I can do it when I see him on 8/25.    Thanks!  Francheska  ----- Message -----  From: Jovana Gutierrez LPN  Sent: 8/5/2020  11:13 AM CDT  To: Francheska Casanova PA-C    Ok spoke to him at great length  I think maybe some sexual wellness therapy  he mentioned it  is there somewhere we could send him  jovana

## 2020-08-05 NOTE — TELEPHONE ENCOUNTER
[facility] Call Center    Phone Message    May a detailed message be left on voicemail: no     Reason for Call: Other: Pt has some questions he would like to ask Francheska after yesterday's appt. He had a flare up and was told to call to see when he should be seen in clinic. Please call to discuss.     Action Taken: Message routed to:  [facility] urology    Travel Screening: Not Applicable

## 2020-08-11 ENCOUNTER — PRE VISIT (OUTPATIENT)
Dept: UROLOGY | Facility: CLINIC | Age: 25
End: 2020-08-11

## 2020-08-20 ENCOUNTER — TELEPHONE (OUTPATIENT)
Dept: SURGERY | Facility: CLINIC | Age: 25
End: 2020-08-20

## 2020-08-25 ENCOUNTER — OFFICE VISIT (OUTPATIENT)
Dept: UROLOGY | Facility: CLINIC | Age: 25
End: 2020-08-25
Payer: COMMERCIAL

## 2020-08-25 VITALS — BODY MASS INDEX: 21.47 KG/M2 | WEIGHT: 150 LBS | HEIGHT: 70 IN

## 2020-08-25 DIAGNOSIS — N52.9 ERECTILE DYSFUNCTION, UNSPECIFIED ERECTILE DYSFUNCTION TYPE: ICD-10-CM

## 2020-08-25 DIAGNOSIS — R39.89 URETHRAL PAIN: Primary | ICD-10-CM

## 2020-08-25 DIAGNOSIS — R36.9 URETHRAL DISCHARGE: ICD-10-CM

## 2020-08-25 LAB
ALBUMIN UR-MCNC: NEGATIVE MG/DL
APPEARANCE UR: CLEAR
BILIRUB UR QL STRIP: NEGATIVE
COLOR UR AUTO: YELLOW
GLUCOSE UR STRIP-MCNC: NEGATIVE MG/DL
HGB UR QL STRIP: NEGATIVE
KETONES UR STRIP-MCNC: NEGATIVE MG/DL
LEUKOCYTE ESTERASE UR QL STRIP: NEGATIVE
NITRATE UR QL: NEGATIVE
PH UR STRIP: 6 PH (ref 5–7)
SOURCE: NORMAL
SP GR UR STRIP: 1.01 (ref 1–1.03)
UROBILINOGEN UR STRIP-MCNC: 0 MG/DL (ref 0–2)

## 2020-08-25 RX ORDER — TADALAFIL 5 MG/1
5 TABLET ORAL EVERY EVENING
Qty: 90 TABLET | Refills: 1 | Status: SHIPPED | OUTPATIENT
Start: 2020-08-25

## 2020-08-25 ASSESSMENT — MIFFLIN-ST. JEOR: SCORE: 1666.65

## 2020-08-25 ASSESSMENT — PAIN SCALES - GENERAL: PAINLEVEL: NO PAIN (0)

## 2020-08-25 NOTE — LETTER
"8/25/2020       RE: Luis Muñoz  [address]  [address]  [facility] [address]     Dear Colleague,    Thank you for referring your patient, Luis Muñoz, to the [facility] UROLOGY AND [facility] FOR PROSTATE AND UROLOGIC CANCERS at [facility]. Please see a copy of my visit note below.    Urology Office Visit - Follow-up    Reason for visit: follow up urethral pain    HPI: Mr. Luis Muñoz is a 24 year old male who is seen today in follow up for urethral pain. Seen in initial consultation virtually on 8/4/2020 - please see note from this date for full history. In brief, symptoms started in Nov 2019 after he started taking an \"all natural male enhancement supplement.\" Symptoms include urinary frequency, dysuria, hesitancy, intermittency, penile discharge, and erection difficulties. He stopped taking the supplement after 2-3 weeks, but symptoms persisted for months so he was seen by [facility] where he was diagnosed and treated for prostatitis. His urinary symptoms and penile discharge improved with antibiotics, though he continued to have a lingering but intermittent \"burning or cold feeling\" in his urethra as well as increased sensitivity on the underside of the penis and some stinging after ejaculation. He believes that his symptoms may be related to his ongoing issues with anxiety, depression, and performance anxiety. He was recommended to push water and limit bladder irritants, use NSAIDs PRN, was given a referral to the [facility], and recommended to follow up in clinic for exam and urine testing if his symptoms persisted.    Today, he notes that symptoms are improved, though he continues to have very small amounts of urethral discharge on occasion. He feels to empty his bladder well with a good stream while standing, some intermittency and needing to strain when sitting or having a bowel movement. He continues to have some erection difficulties " "and performance anxiety for which he is still taking Cialis 5 mg daily - requests a refill. Also asks about Peyronnie's disease which his previous urologist questioned him about - states that his erections curve upward slightly but this has always been the case. He denies pain with erections currently. The curvature does not interfere with intercourse. Also asks about a small lump near the head of the penis that has always been there - not painful, growing, or changing.     PEx  Ht 1.778 m (5' 10\")   Wt 68 kg (150 lb)   BMI 21.52 kg/m    GEN: well-appearing male in NAD  RESP: no increased respiratory effort  : circumcised phallus. Small, flesh-colored soft nodule at the dorsal sulcus that is non-tender.  Orthotopic location of the urethral meatus.  Testes descended bilaterally and of normal firmness and consistency, non-tender, no masses.  Epididymes and cord structures palpably normal bilaterally.  No inguinal hernias.   YASIR: good sphincter tone, smooth rectal mucosa. Normal size prostate without nodules, mass, asymmetry, tenderness, or bogginess to palpation.    A/P  24 year old male with intermittent urethral discomfort and discharge, erectile dysfunction, and anxiety. Discussed possible differentials including prostatitis, urethritis, pelvic floor dysfunction, urethral stricture, anxiety, vs. other. Exam today is essentially normal. Recommend urine testing and if negative, referral to pelvic floor physical therapy.   -UA/UC, ureaplasma, mycoplasma   -Continue to drink mainly water, avoid bladder irritants.  -NSAID medications PRN  -Per patient request, refill of Cialis 5 mg once daily.   -Follow up with PCP for ongoing management of anxiety disorder. He is waiting to get in with the [facility] as well.     Possible PT referral vs. empiric antibiotics vs. cystoscopy as a next step if no improvement.    20 minutes spent with the patient, >50% in counseling and coordination of care.     Francheska UMANA" WANDER Casanova  Department of Urology

## 2020-08-25 NOTE — PROGRESS NOTES
"Urology Office Visit - Follow-up    Reason for visit: follow up urethral pain    HPI: Mr. Luis Muñoz is a 24 year old male who is seen today in follow up for urethral pain. Seen in initial consultation virtually on 8/4/2020 - please see note from this date for full history. In brief, symptoms started in Nov 2019 after he started taking an \"all natural male enhancement supplement.\" Symptoms include urinary frequency, dysuria, hesitancy, intermittency, penile discharge, and erection difficulties. He stopped taking the supplement after 2-3 weeks, but symptoms persisted for months so he was seen by [facility] where he was diagnosed and treated for prostatitis. His urinary symptoms and penile discharge improved with antibiotics, though he continued to have a lingering but intermittent \"burning or cold feeling\" in his urethra as well as increased sensitivity on the underside of the penis and some stinging after ejaculation. He believes that his symptoms may be related to his ongoing issues with anxiety, depression, and performance anxiety. He was recommended to push water and limit bladder irritants, use NSAIDs PRN, was given a referral to the [facility], and recommended to follow up in clinic for exam and urine testing if his symptoms persisted.    Today, he notes that symptoms are improved, though he continues to have very small amounts of urethral discharge on occasion. He feels to empty his bladder well with a good stream while standing, some intermittency and needing to strain when sitting or having a bowel movement. He continues to have some erection difficulties and performance anxiety for which he is still taking Cialis 5 mg daily - requests a refill. Also asks about Peyronnie's disease which his previous urologist questioned him about - states that his erections curve upward slightly but this has always been the case. He denies pain with erections currently. The curvature does not " "interfere with intercourse. Also asks about a small lump near the head of the penis that has always been there - not painful, growing, or changing.     PEx  Ht 1.778 m (5' 10\")   Wt 68 kg (150 lb)   BMI 21.52 kg/m    GEN: well-appearing male in NAD  RESP: no increased respiratory effort  : circumcised phallus. Small, flesh-colored soft nodule at the dorsal sulcus that is non-tender.  Orthotopic location of the urethral meatus.  Testes descended bilaterally and of normal firmness and consistency, non-tender, no masses.  Epididymes and cord structures palpably normal bilaterally.  No inguinal hernias.   YASIR: good sphincter tone, smooth rectal mucosa. Normal size prostate without nodules, mass, asymmetry, tenderness, or bogginess to palpation.    A/P  24 year old male with intermittent urethral discomfort and discharge, erectile dysfunction, and anxiety. Discussed possible differentials including prostatitis, urethritis, pelvic floor dysfunction, urethral stricture, anxiety, vs. other. Exam today is essentially normal. Recommend urine testing and if negative, referral to pelvic floor physical therapy.   -UA/UC, ureaplasma, mycoplasma   -Continue to drink mainly water, avoid bladder irritants.  -NSAID medications PRN  -Per patient request, refill of Cialis 5 mg once daily.   -Follow up with PCP for ongoing management of anxiety disorder. He is waiting to get in with the [facility] as well.     Possible PT referral vs. empiric antibiotics vs. cystoscopy as a next step if no improvement.    20 minutes spent with the patient, >50% in counseling and coordination of care.     Francheska Casanova PA-C  Department of Urology      "

## 2020-08-25 NOTE — PATIENT INSTRUCTIONS
UROLOGY CLINIC VISIT PATIENT INSTRUCTIONS    Urine tests today to check for infection. We'll contact you through [employer] with results when available.     Continue to work with your primary care doctor to manage your anxiety and mental health as this is an important part of your treatment plan.    Next steps pending results of the urine tests.     If you have any issues, questions or concerns in the meantime, do not hesitate to contact us at [phone] or via [employer].     It was a pleasure meeting with you today.  Thank you for allowing me and my team the privilege of caring for you today.  YOU are the reason we are here, and I truly hope we provided you with the excellent service you deserve.  Please let us know if there is anything else we can do for you so that we can be sure you are leaving completely satisfied with your care experience.

## 2020-09-03 LAB
BACTERIA SPEC CULT: NORMAL
BACTERIA SPEC CULT: NORMAL
SPECIMEN SOURCE: NORMAL
SPECIMEN SOURCE: NORMAL

## 2020-11-29 ENCOUNTER — HEALTH MAINTENANCE LETTER (OUTPATIENT)
Age: 25
End: 2020-11-29

## 2021-09-19 ENCOUNTER — HEALTH MAINTENANCE LETTER (OUTPATIENT)
Age: 26
End: 2021-09-19

## 2022-01-09 ENCOUNTER — HEALTH MAINTENANCE LETTER (OUTPATIENT)
Age: 27
End: 2022-01-09

## 2022-11-21 ENCOUNTER — HEALTH MAINTENANCE LETTER (OUTPATIENT)
Age: 27
End: 2022-11-21

## 2023-04-16 ENCOUNTER — HEALTH MAINTENANCE LETTER (OUTPATIENT)
Age: 28
End: 2023-04-16

## (undated) DEVICE — BASIN SET MAJOR

## (undated) DEVICE — KIT PATIENT CARE HANA PROFX W/BOOT LINER SUPINE 6851

## (undated) DEVICE — BLADE CLIPPER SGL USE 9680

## (undated) DEVICE — SU VICRYL 2-0 CT-1 27" UND J259H

## (undated) DEVICE — LINEN TOWEL PACK X5 5464

## (undated) DEVICE — SUCTION MANIFOLD DORNOCH ULTRA CART UL-CL500

## (undated) DEVICE — SOL WATER IRRIG 1000ML BOTTLE 2F7114

## (undated) DEVICE — LIGHT HANDLE X2

## (undated) DEVICE — GLOVE PROTEXIS W/NEU-THERA 8.0  2D73TE80

## (undated) DEVICE — SYR BULB IRRIG 50ML LATEX FREE 0035280

## (undated) DEVICE — SU VICRYL 2-0 RB-1 27" J306H

## (undated) DEVICE — DRSG ADAPTIC 3X8" 6113

## (undated) DEVICE — PREP DURAPREP REMOVER 4OZ 8611

## (undated) DEVICE — LINEN ORTHO PACK 5446

## (undated) DEVICE — SPECIMEN CONTAINER 5OZ STERILE 2600SA

## (undated) DEVICE — SU PDS II 3-0 PS-1 18" Z683G

## (undated) DEVICE — STRAP KNEE/BODY 31143004

## (undated) DEVICE — SOL NACL 0.9% IRRIG 1000ML BOTTLE 2F7124

## (undated) DEVICE — DRAPE STERI TOWEL LG 1010

## (undated) DEVICE — GLOVE PROTEXIS BLUE W/NEU-THERA 7.5  2D73EB75

## (undated) DEVICE — SPONGE RAY-TEC 4X8" 7318

## (undated) DEVICE — ESU CORD BIPOLAR GREEN 10-4000

## (undated) DEVICE — DRAPE C-ARMOR 5 SIDED 5523

## (undated) DEVICE — SPECIMEN CONTAINER URINE 90ML STERILE 75.1435.002

## (undated) DEVICE — DRAPE C-ARM W/STRAPS 42X72" 07-CA104

## (undated) DEVICE — DRSG TEGADERM 2 3/8X2 3/4" 1624W

## (undated) DEVICE — ESU GROUND PAD UNIVERSAL W/O CORD

## (undated) DEVICE — LINEN GOWN OVERSIZE 5408

## (undated) DEVICE — GLOVE PROTEXIS W/NEU-THERA 7.0  2D73TE70

## (undated) DEVICE — ADH SKIN CLOSURE PREMIERPRO EXOFIN 1.0ML 3470

## (undated) DEVICE — DRSG STERI STRIP 1/2X4" R1547

## (undated) DEVICE — Device

## (undated) DEVICE — GLOVE PROTEXIS BLUE W/NEU-THERA 8.0  2D73EB80

## (undated) DEVICE — DRSG GAUZE 4X8" NON21842

## (undated) DEVICE — TAPE MICROFOAM 4" 1528-4

## (undated) DEVICE — DRSG TELFA 3X8" 1238

## (undated) DEVICE — PREP DURAPREP 26ML APL 8630

## (undated) RX ORDER — ONDANSETRON 2 MG/ML
INJECTION INTRAMUSCULAR; INTRAVENOUS
Status: DISPENSED
Start: 2020-06-23

## (undated) RX ORDER — ACETAMINOPHEN 325 MG/1
TABLET ORAL
Status: DISPENSED
Start: 2020-06-23

## (undated) RX ORDER — GABAPENTIN 300 MG/1
CAPSULE ORAL
Status: DISPENSED
Start: 2020-06-23

## (undated) RX ORDER — OXYCODONE HYDROCHLORIDE 5 MG/1
TABLET ORAL
Status: DISPENSED
Start: 2020-06-23

## (undated) RX ORDER — DEXAMETHASONE SODIUM PHOSPHATE 4 MG/ML
INJECTION, SOLUTION INTRA-ARTICULAR; INTRALESIONAL; INTRAMUSCULAR; INTRAVENOUS; SOFT TISSUE
Status: DISPENSED
Start: 2020-06-23

## (undated) RX ORDER — BUPIVACAINE HYDROCHLORIDE AND EPINEPHRINE 2.5; 5 MG/ML; UG/ML
INJECTION, SOLUTION INFILTRATION; PERINEURAL
Status: DISPENSED
Start: 2020-06-23

## (undated) RX ORDER — CEFAZOLIN SODIUM 1 G/3ML
INJECTION, POWDER, FOR SOLUTION INTRAMUSCULAR; INTRAVENOUS
Status: DISPENSED
Start: 2020-06-23

## (undated) RX ORDER — FENTANYL CITRATE 50 UG/ML
INJECTION, SOLUTION INTRAMUSCULAR; INTRAVENOUS
Status: DISPENSED
Start: 2020-06-23

## (undated) RX ORDER — IBUPROFEN 600 MG/1
TABLET, FILM COATED ORAL
Status: DISPENSED
Start: 2020-06-23

## (undated) RX ORDER — LIDOCAINE HYDROCHLORIDE 20 MG/ML
INJECTION, SOLUTION EPIDURAL; INFILTRATION; INTRACAUDAL; PERINEURAL
Status: DISPENSED
Start: 2020-06-23

## (undated) RX ORDER — PROPOFOL 10 MG/ML
INJECTION, EMULSION INTRAVENOUS
Status: DISPENSED
Start: 2020-06-23

## (undated) RX ORDER — SODIUM CHLORIDE, SODIUM LACTATE, POTASSIUM CHLORIDE, CALCIUM CHLORIDE 600; 310; 30; 20 MG/100ML; MG/100ML; MG/100ML; MG/100ML
INJECTION, SOLUTION INTRAVENOUS
Status: DISPENSED
Start: 2020-06-23